# Patient Record
Sex: FEMALE | Race: WHITE | Employment: UNEMPLOYED | RURAL
[De-identification: names, ages, dates, MRNs, and addresses within clinical notes are randomized per-mention and may not be internally consistent; named-entity substitution may affect disease eponyms.]

---

## 2017-02-12 DIAGNOSIS — I10 ESSENTIAL HYPERTENSION: ICD-10-CM

## 2017-02-13 RX ORDER — ENALAPRIL MALEATE AND HYDROCHLOROTHIAZIDE 10; 25 MG/1; MG/1
TABLET ORAL
Qty: 30 TAB | Refills: 0 | Status: SHIPPED | OUTPATIENT
Start: 2017-02-13 | End: 2017-02-22 | Stop reason: SDUPTHER

## 2017-02-22 ENCOUNTER — OFFICE VISIT (OUTPATIENT)
Dept: INTERNAL MEDICINE CLINIC | Age: 62
End: 2017-02-22

## 2017-02-22 VITALS
WEIGHT: 143.6 LBS | SYSTOLIC BLOOD PRESSURE: 128 MMHG | BODY MASS INDEX: 23.93 KG/M2 | DIASTOLIC BLOOD PRESSURE: 78 MMHG | HEART RATE: 93 BPM | RESPIRATION RATE: 18 BRPM | OXYGEN SATURATION: 100 % | TEMPERATURE: 96.8 F | HEIGHT: 65 IN

## 2017-02-22 DIAGNOSIS — I10 ESSENTIAL HYPERTENSION: ICD-10-CM

## 2017-02-22 DIAGNOSIS — R80.9 PROTEINURIA: ICD-10-CM

## 2017-02-22 DIAGNOSIS — E78.00 HIGH CHOLESTEROL: ICD-10-CM

## 2017-02-22 DIAGNOSIS — R82.90 ABNORMAL URINE: ICD-10-CM

## 2017-02-22 DIAGNOSIS — R73.03 PREDIABETES: ICD-10-CM

## 2017-02-22 DIAGNOSIS — Z00.00 PHYSICAL EXAM: Primary | ICD-10-CM

## 2017-02-22 LAB
BILIRUB UR QL STRIP: NEGATIVE
GLUCOSE UR-MCNC: NEGATIVE MG/DL
KETONES P FAST UR STRIP-MCNC: NEGATIVE MG/DL
PH UR STRIP: 7 [PH] (ref 4.6–8)
PROT UR QL STRIP: NEGATIVE MG/DL
SP GR UR STRIP: 1.01 (ref 1–1.03)
UA UROBILINOGEN AMB POC: NORMAL (ref 0.2–1)
URINALYSIS CLARITY POC: CLEAR
URINALYSIS COLOR POC: YELLOW
URINE BLOOD POC: NEGATIVE
URINE LEUKOCYTES POC: NORMAL
URINE NITRITES POC: NEGATIVE

## 2017-02-22 RX ORDER — ENALAPRIL MALEATE AND HYDROCHLOROTHIAZIDE 10; 25 MG/1; MG/1
TABLET ORAL
Qty: 90 TAB | Refills: 1 | Status: SHIPPED | OUTPATIENT
Start: 2017-02-22 | End: 2017-08-23 | Stop reason: SDUPTHER

## 2017-02-22 NOTE — PROGRESS NOTES
Chief Complaint   Patient presents with    Hypertension     Medication Refill    Diabetes     1. Have you been to the ER, urgent care clinic since your last visit? Hospitalized since your last visit? No    2. Have you seen or consulted any other health care providers outside of the [address] [place] [address] since your last visit? Include any pap smears or colon screening.  No     Health Maintenance Due   Topic Date Due    Hepatitis C Screening  1955    PAP AKA CERVICAL CYTOLOGY  01/14/2017     Pap will be done in June 2017

## 2017-02-22 NOTE — MR AVS SNAPSHOT
Visit Information Date & Time Provider Department Dept. Phone Encounter #  
 2/22/2017  9:15 AM Errol Chiang NP [facility] [id number] Follow-up Instructions Return in about 6 months (around 8/22/2017). Upcoming Health Maintenance Date Due Hepatitis C Screening 1955 PAP AKA CERVICAL CYTOLOGY 1/14/2017 INFLUENZA AGE 9 TO ADULT 4/30/2017* DTaP/Tdap/Td series (1 - Tdap) 8/9/2017* BREAST CANCER SCRN MAMMOGRAM 1/7/2018* Bone Densitometry 2/17/2019 COLONOSCOPY 1/9/2020 *Topic was postponed. The date shown is not the original due date. Allergies as of 2/22/2017  Review Complete On: 2/22/2017 By: Errol Chiang NP No Known Allergies Current Immunizations  Reviewed on 8/9/2016 Name Date Influenza Vaccine 9/15/2014, 11/18/2013, 1/20/2013, 11/8/2011 Zoster Vaccine, Live 9/15/2014 Not reviewed this visit You Were Diagnosed With   
  
 Codes Comments Essential hypertension    -  Primary ICD-10-CM: I10 
ICD-9-CM: 401.9 High cholesterol     ICD-10-CM: E78.00 ICD-9-CM: 272.0 Prediabetes     ICD-10-CM: R73.03 
ICD-9-CM: 790.29 Proteinuria     ICD-10-CM: R80.9 ICD-9-CM: 791.0 Vitals BP  
  
  
  
  
  
 128/78 (BP 1 Location: Left arm, BP Patient Position: Sitting) Vitals History BMI and BSA Data Body Mass Index Body Surface Area  
 23.9 kg/m 2 1.73 m 2 Preferred Pharmacy Pharmacy Name Phone [place] [phone] [address] [phone] Your Updated Medication List  
  
   
This list is accurate as of: 2/22/17  9:57 AM.  Always use your most recent med list.  
  
  
  
  
 [employer]EL[employer] CH[employer]DRENS ASPIRIN 81 mg chewable tablet Generic drug:  aspirin Take 81 mg by mouth daily. calcium carb-vit d2-minerals Tab Commonly known as:  CALTRATE PLUS Take 1 Tab by mouth two (2) times a day.   
  
 CENTRUM PO  
 Take  by mouth.  
  
 cranberry 500 mg capsule Take 500 mg by mouth daily. enalapril-hydroCHLOROthiazide 10-25 mg tablet Commonly known as:  VASERETIC  
take 1 tablet by mouth once daily FISH OIL 1,000 mg Cap Generic drug:  omega-3 fatty acids-vitamin e Take 1 Cap by mouth.  
  
 melatonin 3 mg tablet Take  by mouth. Prescriptions Sent to Pharmacy Refills  
 enalapril-hydroCHLOROthiazide (VASERETIC) 10-25 mg tablet 1 Sig: take 1 tablet by mouth once daily Class: Normal  
 Pharmacy: [place] [id number] #: [phone] We Performed the Following AMB POC URINALYSIS DIP STICK MANUAL W/O MICRO [96428 CPT(R)] HEMOGLOBIN A1C WITH EAG [83739 CPT(R)] METABOLIC PANEL, COMPREHENSIVE [87607 CPT(R)] Follow-up Instructions Return in about 6 months (around 8/22/2017). Introducing [facility] & [facility]! Jenny Chavez introduces [employer] patient portal. Now you can access parts of your medical record, email your doctor's office, and request medication refills online. 1. In your internet browser, go to [email or web address] 2. Click on the First Time User? Click Here link in the Sign In box. You will see the New Member Sign Up page. 3. Enter your muz[employer] tv Access Code exactly as it appears below. You will not need to use this code after youve completed the sign-up process. If you do not sign up before the expiration date, you must request a new code. · [employer] tv Access Code: [email or web address] Expires: 5/23/2017  9:57 AM 
 
4. Enter the last four digits of your Social Security Number (xxxx) and Date of Birth (mm/dd/yyyy) as indicated and click Submit. You will be taken to the next sign-up page. 5. Create a muzu tv ID. This will be your muzu tv login ID and cannot be changed, so think of one that is secure and easy to remember. 6. Create a [employer] password. You can change your password at any time. 7. Enter your Password Reset Question and Answer. This can be used at a later time if you forget your password. 8. Enter your e-mail address. You will receive e-mail notification when new information is available in [address] 9. Click Sign Up. You can now view and download portions of your medical record. 10. Click the Download Summary menu link to download a portable copy of your medical information. If you have questions, please visit the Frequently Asked Questions section of the [employer] website. Remember, [employer] is NOT to be used for urgent needs. For medical emergencies, dial 911. Now available from your iPhone and Android! Please provide this summary of care documentation to your next provider. Your primary care clinician is listed as Alysha Fernández. If you have any questions after today's visit, please call [phone].

## 2017-02-24 LAB
ALBUMIN SERPL-MCNC: 4.1 G/DL (ref 3.6–4.8)
ALBUMIN/GLOB SERPL: 2 {RATIO} (ref 1.1–2.5)
ALP SERPL-CCNC: 70 IU/L (ref 39–117)
ALT SERPL-CCNC: 15 IU/L (ref 0–32)
AST SERPL-CCNC: 24 IU/L (ref 0–40)
BACTERIA UR CULT: NORMAL
BILIRUB SERPL-MCNC: 0.8 MG/DL (ref 0–1.2)
BUN SERPL-MCNC: 9 MG/DL (ref 8–27)
BUN/CREAT SERPL: 14 (ref 11–26)
CALCIUM SERPL-MCNC: 8.9 MG/DL (ref 8.7–10.3)
CHLORIDE SERPL-SCNC: 99 MMOL/L (ref 96–106)
CHOLEST SERPL-MCNC: 206 MG/DL (ref 100–199)
CO2 SERPL-SCNC: 23 MMOL/L (ref 18–29)
CREAT SERPL-MCNC: 0.65 MG/DL (ref 0.57–1)
EST. AVERAGE GLUCOSE BLD GHB EST-MCNC: 111 MG/DL
GLOBULIN SER CALC-MCNC: 2.1 G/DL (ref 1.5–4.5)
GLUCOSE SERPL-MCNC: 92 MG/DL (ref 65–99)
HBA1C MFR BLD: 5.5 % (ref 4.8–5.6)
HDLC SERPL-MCNC: 82 MG/DL
INTERPRETATION, 910389: NORMAL
LDLC SERPL CALC-MCNC: 110 MG/DL (ref 0–99)
POTASSIUM SERPL-SCNC: 4.2 MMOL/L (ref 3.5–5.2)
PROT SERPL-MCNC: 6.2 G/DL (ref 6–8.5)
SODIUM SERPL-SCNC: 137 MMOL/L (ref 134–144)
TRIGL SERPL-MCNC: 71 MG/DL (ref 0–149)
VLDLC SERPL CALC-MCNC: 14 MG/DL (ref 5–40)

## 2017-02-27 NOTE — PROGRESS NOTES
HISTORY OF PRESENT ILLNESS  Edda Lora is a 59 y.o. female. HPI  Chief Complaint   Patient presents with    Hypertension     Medication Refill    Blood sugar problem     Presented results from comprehensive procedures done with [facility]. Will check cmp, A1c, lipid panel  Today. Would like to have urine checked for proteinuria. Past Medical History:   Diagnosis Date    Arthropathy of cervical facet joint ([facility] [address] 9/2012    Cancer ([facility] [address] 2006    colon    Carotid atherosclerosis 9/2012    Hypertension 2007    Osteopenia 9/2012    Prediabetes      Social History     Social History    Marital status:      Spouse name: N/A    Number of children: 2    Years of education: N/A     Occupational History          Social History Main Topics    Smoking status: Never Smoker    Smokeless tobacco: Never Used    Alcohol use Yes      Comment: rarely    Drug use: No    Sexual activity: Not Currently     Other Topics Concern     Service No    Blood Transfusions No    Caffeine Concern No    Occupational Exposure No    Hobby Hazards No    Sleep Concern No    Stress Concern Yes     Patient is working on ways to self-calm    Weight Concern No    Special Diet No    Back Care No    Exercise Yes    Seat Belt Yes    Self-Exams Yes     Social History Narrative    , , 2 children       Review of Systems   Constitutional: Negative. Negative for chills, fever and malaise/fatigue. HENT: Negative. Eyes: Negative. Respiratory: Negative. Cardiovascular: Negative. Gastrointestinal: Negative. Genitourinary: Negative. Skin: Negative. Physical Exam   Constitutional: She is oriented to person, place, and time. She appears well-developed and well-nourished. HENT:   Head: Normocephalic and atraumatic. Eyes: Conjunctivae are normal. Right eye exhibits no discharge. Left eye exhibits no discharge.    Neck: Normal range of motion. Neck supple. Cardiovascular: Normal rate, regular rhythm, normal heart sounds and intact distal pulses. Exam reveals no gallop and no friction rub. No murmur heard. Pulmonary/Chest: Effort normal and breath sounds normal. No respiratory distress. She has no wheezes. She has no rales. Abdominal: Soft. Bowel sounds are normal. She exhibits no distension. There is no tenderness. Musculoskeletal: Normal range of motion. She exhibits no edema, tenderness or deformity. Neurological: She is alert and oriented to person, place, and time. She has normal reflexes. No cranial nerve deficit. She exhibits normal muscle tone. Coordination normal.   Skin: Skin is warm and dry. She is not diaphoretic. Nursing note and vitals reviewed. Plan of care and AVS reviewed with patient who verbalized understanding. ASSESSMENT and PLAN    ICD-10-CM ICD-9-CM    1. Physical exam Z00.00 V70.9    2. Essential hypertension F69 481.6 METABOLIC PANEL, COMPREHENSIVE      HEMOGLOBIN A1C WITH EAG      AMB POC URINALYSIS DIP STICK MANUAL W/O MICRO      enalapril-hydroCHLOROthiazide (VASERETIC) 10-25 mg tablet      CULTURE, URINE      DC HANDLG&/OR CONVEY OF SPEC FOR TR OFFICE TO LAB      DC COLLECTION VENOUS BLOOD,VENIPUNCTURE   3. High cholesterol M99.93 281.1 METABOLIC PANEL, COMPREHENSIVE      HEMOGLOBIN A1C WITH EAG      LIPID PANEL      AMB POC URINALYSIS DIP STICK MANUAL W/O MICRO      enalapril-hydroCHLOROthiazide (VASERETIC) 10-25 mg tablet      CULTURE, URINE      DC HANDLG&/OR CONVEY OF SPEC FOR TR OFFICE TO LAB      DC COLLECTION VENOUS BLOOD,VENIPUNCTURE   4. Prediabetes G54.33 835.06 METABOLIC PANEL, COMPREHENSIVE      HEMOGLOBIN A1C WITH EAG      AMB POC URINALYSIS DIP STICK MANUAL W/O MICRO      enalapril-hydroCHLOROthiazide (VASERETIC) 10-25 mg tablet      CULTURE, URINE      DC HANDLG&/OR CONVEY OF SPEC FOR TR OFFICE TO LAB      DC COLLECTION VENOUS BLOOD,VENIPUNCTURE   5.  Proteinuria R80.9 791.0 METABOLIC PANEL, COMPREHENSIVE      HEMOGLOBIN A1C WITH EAG      AMB POC URINALYSIS DIP STICK MANUAL W/O MICRO      enalapril-hydroCHLOROthiazide (VASERETIC) 10-25 mg tablet      CULTURE, URINE      ND HANDLG&/OR CONVEY OF SPEC FOR TR OFFICE TO LAB      ND COLLECTION VENOUS BLOOD,VENIPUNCTURE   6. Abnormal urine N79.25 766.1 METABOLIC PANEL, COMPREHENSIVE      HEMOGLOBIN A1C WITH EAG      AMB POC URINALYSIS DIP STICK MANUAL W/O MICRO      enalapril-hydroCHLOROthiazide (VASERETIC) 10-25 mg tablet      CULTURE, URINE      ND HANDLG&/OR CONVEY OF SPEC FOR TR OFFICE TO LAB      ND COLLECTION VENOUS BLOOD,VENIPUNCTURE   7. Normal body mass index (BMI) Z78.9 V49.89    Will notify of lab results in letter.   F/u 6 months HTN

## 2017-08-08 ENCOUNTER — OFFICE VISIT (OUTPATIENT)
Dept: INTERNAL MEDICINE CLINIC | Age: 62
End: 2017-08-08

## 2017-08-08 VITALS
WEIGHT: 142.2 LBS | HEART RATE: 87 BPM | DIASTOLIC BLOOD PRESSURE: 78 MMHG | TEMPERATURE: 96.6 F | OXYGEN SATURATION: 100 % | BODY MASS INDEX: 23.69 KG/M2 | HEIGHT: 65 IN | RESPIRATION RATE: 16 BRPM | SYSTOLIC BLOOD PRESSURE: 146 MMHG

## 2017-08-08 DIAGNOSIS — I10 ESSENTIAL HYPERTENSION: Primary | ICD-10-CM

## 2017-08-08 DIAGNOSIS — R73.03 PREDIABETES: ICD-10-CM

## 2017-08-08 DIAGNOSIS — E78.00 HIGH CHOLESTEROL: ICD-10-CM

## 2017-08-08 DIAGNOSIS — Z87.448 HX OF HEMATURIA: ICD-10-CM

## 2017-08-08 LAB
BILIRUB UR QL STRIP: NEGATIVE
GLUCOSE UR-MCNC: NEGATIVE MG/DL
KETONES P FAST UR STRIP-MCNC: NEGATIVE MG/DL
PH UR STRIP: 7 [PH] (ref 4.6–8)
PROT UR QL STRIP: NEGATIVE MG/DL
SP GR UR STRIP: 1.02 (ref 1–1.03)
UA UROBILINOGEN AMB POC: NORMAL (ref 0.2–1)
URINALYSIS CLARITY POC: CLEAR
URINALYSIS COLOR POC: YELLOW
URINE BLOOD POC: NEGATIVE
URINE LEUKOCYTES POC: NORMAL
URINE NITRITES POC: NEGATIVE

## 2017-08-08 RX ORDER — TETANUS TOXOID, REDUCED DIPHTHERIA TOXOID AND ACELLULAR PERTUSSIS VACCINE, ADSORBED 5; 2.5; 8; 8; 2.5 [IU]/.5ML; [IU]/.5ML; UG/.5ML; UG/.5ML; UG/.5ML
SUSPENSION INTRAMUSCULAR
Refills: 0 | COMMUNITY
Start: 2017-06-07

## 2017-08-08 RX ORDER — GUAIFENESIN 100 MG/5ML
81 LIQUID (ML) ORAL DAILY
COMMUNITY
Start: 2017-08-08

## 2017-08-08 RX ORDER — EFINACONAZOLE 100 MG/ML
SOLUTION TOPICAL
COMMUNITY
Start: 2017-06-09

## 2017-08-08 NOTE — PROGRESS NOTES
Chief Complaint   Patient presents with    Hypertension     FOLLOW UP    Ear Fullness     RIGHT EAR;     1. Have you been to the ER, urgent care clinic since your last visit? Hospitalized since your last visit? No    2. Have you seen or consulted any other health care providers outside of the [address] [place] [address] since your last visit? Include any pap smears or colon screening.  No     Health Maintenance Due   Topic Date Due    PAP AKA CERVICAL CYTOLOGY  01/14/2017    INFLUENZA AGE 9 TO ADULT  08/01/2017     Pap Smear (Next Week) ---[facility])

## 2017-08-08 NOTE — PROGRESS NOTES
HISTORY OF PRESENT ILLNESS  Annika Green is a 57 y.o. female. HPI  Chief Complaint   Patient presents with    Hypertension     FOLLOW UP    Ear Fullness     RIGHT EAR; Will see GYN soon for Pap  Has results for [facility]; Will get copies    BP elevated to day  Admits to taking BP medication as prescribed. Denies chest pain or SOB, or swelling of extremities. Admits to following diet to lower BP. C/O ear fullness x 1 month  States discomfort has resolved. No treatment  Review of Systems   Constitutional: Negative. Negative for chills and fever. HENT: Negative. Negative for congestion, ear pain and sore throat. Eyes: Negative. Negative for blurred vision, pain and discharge. Respiratory: Negative for cough, shortness of breath and wheezing. Cardiovascular: Negative. Negative for chest pain, palpitations and leg swelling. Gastrointestinal: Negative. Negative for abdominal pain, blood in stool, constipation, diarrhea, nausea and vomiting. Genitourinary: Negative. Musculoskeletal: Negative. Negative for back pain and myalgias. Skin: Negative. Neurological: Negative for dizziness and headaches. Physical Exam   Constitutional: She is oriented to person, place, and time. She appears well-developed and well-nourished. No distress. HENT:   Head: Normocephalic and atraumatic. Eyes: Conjunctivae are normal.   Neck: Normal range of motion. Cardiovascular: Normal rate, regular rhythm, normal heart sounds and intact distal pulses. Exam reveals no gallop and no friction rub. No murmur heard. Pulmonary/Chest: Effort normal and breath sounds normal. No respiratory distress. She has no wheezes. She has no rales. Abdominal: Soft. Musculoskeletal: Normal range of motion. Neurological: She is alert and oriented to person, place, and time. Skin: Skin is warm and dry. She is not diaphoretic. Psychiatric: She has a normal mood and affect.  Her behavior is normal. Thought content normal.   Nursing note and vitals reviewed. Plan of care and AVS reviewed with patient who verbalized understanding. ASSESSMENT and PLAN    ICD-10-CM ICD-9-CM    1. Essential hypertension I10 401.9 BOOSTRIX TDAP 2.5-8-5 Lf-mcg-Lf/0.5mL syrg      JUBLIA pamela topical solution      aspirin (EVELINE CHILDRENS ASPIRIN) 81 mg chewable tablet      METABOLIC PANEL, COMPREHENSIVE      HEMOGLOBIN A1C WITH EAG      AMB POC URINALYSIS DIP STICK MANUAL W/O MICRO      LIPID PANEL      WI HANDLG&/OR CONVEY OF SPEC FOR TR OFFICE TO LAB      WI COLLECTION VENOUS BLOOD,VENIPUNCTURE   2. Prediabetes R73.03 790.29 BOOSTRIX TDAP 2.5-8-5 Lf-mcg-Lf/0.5mL syrg      JUBLIA pamela topical solution      aspirin (EVELINE CHILDRENS ASPIRIN) 81 mg chewable tablet      METABOLIC PANEL, COMPREHENSIVE      HEMOGLOBIN A1C WITH EAG      AMB POC URINALYSIS DIP STICK MANUAL W/O MICRO      LIPID PANEL      WI HANDLG&/OR CONVEY OF SPEC FOR TR OFFICE TO LAB      WI COLLECTION VENOUS BLOOD,VENIPUNCTURE   3. Hx of hematuria Z87.448 V13.09 BOOSTRIX TDAP 2.5-8-5 Lf-mcg-Lf/0.5mL syrg      JUBLIA pamela topical solution      aspirin (EVELINE CHILDRENS ASPIRIN) 81 mg chewable tablet      METABOLIC PANEL, COMPREHENSIVE      HEMOGLOBIN A1C WITH EAG      AMB POC URINALYSIS DIP STICK MANUAL W/O MICRO      LIPID PANEL   4. High cholesterol E78.00 272.0 BOOSTRIX TDAP 2.5-8-5 Lf-mcg-Lf/0.5mL syrg      JUBLIA pamela topical solution      aspirin (EVELINE CHILDRENS ASPIRIN) 81 mg chewable tablet      METABOLIC PANEL, COMPREHENSIVE      HEMOGLOBIN A1C WITH EAG      AMB POC URINALYSIS DIP STICK MANUAL W/O MICRO      LIPID PANEL      WI HANDLG&/OR CONVEY OF SPEC FOR TR OFFICE TO LAB      WI COLLECTION VENOUS BLOOD,VENIPUNCTURE   Will notify patient of labs  F/U 6 month HTN.

## 2017-08-08 NOTE — MR AVS SNAPSHOT
Visit Information Date & Time Provider Department Dept. Phone Encounter #  
 8/8/2017  8:45 AM Arpit Zuluaga NP [facility] [phone] [id number] Follow-up Instructions Return in about 6 months (around 2/8/2018) for BP, prediabetes. Upcoming Health Maintenance Date Due  
 PAP AKA CERVICAL CYTOLOGY 1/14/2017 INFLUENZA AGE 9 TO ADULT 8/1/2017 DTaP/Tdap/Td series (1 - Tdap) 8/9/2017* BREAST CANCER SCRN MAMMOGRAM 1/7/2018* Hepatitis C Screening 2/26/2018* Bone Densitometry 2/17/2019 COLONOSCOPY 1/9/2020 *Topic was postponed. The date shown is not the original due date. Allergies as of 8/8/2017  Review Complete On: 8/8/2017 By: Arpit Zuluaga NP No Known Allergies Current Immunizations  Reviewed on 8/9/2016 Name Date Influenza Vaccine 9/15/2014, 11/18/2013, 1/20/2013, 11/8/2011 Zoster Vaccine, Live 9/15/2014 Not reviewed this visit You Were Diagnosed With   
  
 Codes Comments Essential hypertension    -  Primary ICD-10-CM: I10 
ICD-9-CM: 401.9 Prediabetes     ICD-10-CM: R73.03 
ICD-9-CM: 790.29 Hx of hematuria     ICD-10-CM: Z87.448 ICD-9-CM: V13.09 Vitals BP Pulse Temp Resp Height(growth percentile) Weight(growth percentile) 173/74 (BP 1 Location: Left arm, BP Patient Position: Sitting) 91 96.6 °F (35.9 °C) (Oral) 16 5' 5\" (1.651 m) 142 lb 3.2 oz (64.5 kg) LMP SpO2 BMI OB Status Smoking Status 06/20/2005 100% 23.66 kg/m2 Postmenopausal Never Smoker Vitals History BMI and BSA Data Body Mass Index Body Surface Area  
 23.66 kg/m 2 1.72 m 2 Preferred Pharmacy Pharmacy Name Phone [facility] [phone] [facility] [address] [phone] Your Updated Medication List  
  
   
This list is accurate as of: 8/8/17  9:29 AM.  Always use your most recent med list.  
  
  
  
  
 [employer] CHILDRENS ASPIRIN 81 mg chewable tablet Generic drug:  aspirin Take 1 Tab by mouth daily. BOOSTRIX TDAP 2.5-8-5 Lf-mcg-Lf/0.5mL Syrg Generic drug:  Diphth, Pertus(Acell), Tetanus  
inject 0.5 milliliter intramuscularly  
  
 calcium carb-vit d2-minerals Tab Commonly known as:  CALTRATE PLUS Take 1 Tab by mouth two (2) times a day. CENTRUM PO Take  by mouth.  
  
 cranberry 500 mg capsule Take 500 mg by mouth daily. enalapril-hydroCHLOROthiazide 10-25 mg tablet Commonly known as:  VASERETIC  
take 1 tablet by mouth once daily FISH OIL 1,000 mg Cap Generic drug:  omega-3 fatty acids-vitamin e Take 1 Cap by mouth. JUBLIA Jeanine topical solution Generic drug:  efinaconazole  
  
 melatonin 3 mg tablet Take  by mouth. We Performed the Following AMB POC URINALYSIS DIP STICK MANUAL W/O MICRO [78883 CPT(R)] HEMOGLOBIN A1C WITH EAG [97395 CPT(R)] METABOLIC PANEL, COMPREHENSIVE [80203 CPT(R)] Follow-up Instructions Return in about 6 months (around 2/8/2018) for BP, prediabetes. Introducing hospitals [facility]! Mathew Gillis introduces [employer] patient portal. Now you can access parts of your medical record, email your doctor's office, and request medication refills online. 1. In your internet browser, go to [email or web address] 2. Click on the First Time User? Click Here link in the Sign In box. You will see the New Member Sign Up page. 3. Enter your [employer] Access Code exactly as it appears below. You will not need to use this code after youve completed the sign-up process. If you do not sign up before the expiration date, you must request a new code. · [employer] Access Code: [email or web address] Expires: 11/6/2017  8:42 AM 
 
4. Enter the last four digits of your Social Security Number (xxxx) and Date of Birth (mm/dd/yyyy) as indicated and click Submit. You will be taken to the next sign-up page. 5. Create a [employer] ID. This will be your [employer] login ID and cannot be changed, so think of one that is secure and easy to remember. 6. Create a [employer] password. You can change your password at any time. 7. Enter your Password Reset Question and Answer. This can be used at a later time if you forget your password. 8. Enter your e-mail address. You will receive e-mail notification when new information is available in [address] 9. Click Sign Up. You can now view and download portions of your medical record. 10. Click the Download Summary menu link to download a portable copy of your medical information. If you have questions, please visit the Frequently Asked Questions section of the [employer] website. Remember, [employer] is NOT to be used for urgent needs. For medical emergencies, dial 911. Now available from your iPhone and Android! Please provide this summary of care documentation to your next provider. Your primary care clinician is listed as Jenni Tian. If you have any questions after today's visit, please call [phone].

## 2017-08-09 LAB
ALBUMIN SERPL-MCNC: 4.4 G/DL (ref 3.6–4.8)
ALBUMIN/GLOB SERPL: 2.1 {RATIO} (ref 1.2–2.2)
ALP SERPL-CCNC: 80 IU/L (ref 39–117)
ALT SERPL-CCNC: 17 IU/L (ref 0–32)
AST SERPL-CCNC: 25 IU/L (ref 0–40)
BILIRUB SERPL-MCNC: 0.6 MG/DL (ref 0–1.2)
BUN SERPL-MCNC: 13 MG/DL (ref 8–27)
BUN/CREAT SERPL: 22 (ref 12–28)
CALCIUM SERPL-MCNC: 9.3 MG/DL (ref 8.7–10.3)
CHLORIDE SERPL-SCNC: 99 MMOL/L (ref 96–106)
CHOLEST SERPL-MCNC: 214 MG/DL (ref 100–199)
CO2 SERPL-SCNC: 27 MMOL/L (ref 18–29)
CREAT SERPL-MCNC: 0.59 MG/DL (ref 0.57–1)
EST. AVERAGE GLUCOSE BLD GHB EST-MCNC: 114 MG/DL
GLOBULIN SER CALC-MCNC: 2.1 G/DL (ref 1.5–4.5)
GLUCOSE SERPL-MCNC: 90 MG/DL (ref 65–99)
HBA1C MFR BLD: 5.6 % (ref 4.8–5.6)
HDLC SERPL-MCNC: 82 MG/DL
INTERPRETATION, 910389: NORMAL
LDLC SERPL CALC-MCNC: 121 MG/DL (ref 0–99)
POTASSIUM SERPL-SCNC: 4.3 MMOL/L (ref 3.5–5.2)
PROT SERPL-MCNC: 6.5 G/DL (ref 6–8.5)
SODIUM SERPL-SCNC: 139 MMOL/L (ref 134–144)
TRIGL SERPL-MCNC: 57 MG/DL (ref 0–149)
VLDLC SERPL CALC-MCNC: 11 MG/DL (ref 5–40)

## 2017-08-16 ENCOUNTER — TELEPHONE (OUTPATIENT)
Dept: INTERNAL MEDICINE CLINIC | Age: 62
End: 2017-08-16

## 2017-08-16 NOTE — TELEPHONE ENCOUNTER
Chief Complaint   Patient presents with    Returning Call    Results     LABS     Patient has been informed of her labs and that it will be mailed to her home address. She has questions about the labs that will check her kidneys.

## 2017-08-23 DIAGNOSIS — E78.00 HIGH CHOLESTEROL: ICD-10-CM

## 2017-08-23 DIAGNOSIS — R82.90 ABNORMAL URINE: ICD-10-CM

## 2017-08-23 DIAGNOSIS — R80.9 PROTEINURIA: ICD-10-CM

## 2017-08-23 DIAGNOSIS — R73.03 PREDIABETES: ICD-10-CM

## 2017-08-23 DIAGNOSIS — I10 ESSENTIAL HYPERTENSION: ICD-10-CM

## 2017-08-26 RX ORDER — ENALAPRIL MALEATE AND HYDROCHLOROTHIAZIDE 10; 25 MG/1; MG/1
TABLET ORAL
Qty: 90 TAB | Refills: 1 | Status: SHIPPED | OUTPATIENT
Start: 2017-08-26 | End: 2017-08-29 | Stop reason: SDUPTHER

## 2017-08-29 ENCOUNTER — CLINICAL SUPPORT (OUTPATIENT)
Dept: INTERNAL MEDICINE CLINIC | Age: 62
End: 2017-08-29

## 2017-08-29 VITALS
HEIGHT: 65 IN | SYSTOLIC BLOOD PRESSURE: 136 MMHG | TEMPERATURE: 97.5 F | BODY MASS INDEX: 23.63 KG/M2 | OXYGEN SATURATION: 100 % | RESPIRATION RATE: 16 BRPM | WEIGHT: 141.8 LBS | DIASTOLIC BLOOD PRESSURE: 72 MMHG | HEART RATE: 86 BPM

## 2017-08-29 DIAGNOSIS — R73.03 PREDIABETES: ICD-10-CM

## 2017-08-29 DIAGNOSIS — E78.00 HIGH CHOLESTEROL: ICD-10-CM

## 2017-08-29 DIAGNOSIS — R82.90 ABNORMAL URINE: ICD-10-CM

## 2017-08-29 DIAGNOSIS — Z01.30 BP CHECK: Primary | ICD-10-CM

## 2017-08-29 DIAGNOSIS — I10 ESSENTIAL HYPERTENSION: ICD-10-CM

## 2017-08-29 NOTE — TELEPHONE ENCOUNTER
Requested Prescriptions     Pending Prescriptions Disp Refills    enalapril-hydroCHLOROthiazide (VASERETIC) 10-25 mg tablet 90 Tab 1     Last Office Visit:  08/29/2017  Last Filled:  08/26/2017 + 1 refill  Changes Made to Medication on Last Visit:  None

## 2017-08-30 RX ORDER — ENALAPRIL MALEATE AND HYDROCHLOROTHIAZIDE 10; 25 MG/1; MG/1
1 TABLET ORAL DAILY
Qty: 90 TAB | Refills: 1 | Status: SHIPPED | OUTPATIENT
Start: 2017-08-30 | End: 2018-02-06 | Stop reason: SDUPTHER

## 2018-02-06 ENCOUNTER — OFFICE VISIT (OUTPATIENT)
Dept: INTERNAL MEDICINE CLINIC | Age: 63
End: 2018-02-06

## 2018-02-06 VITALS
WEIGHT: 149.4 LBS | SYSTOLIC BLOOD PRESSURE: 138 MMHG | RESPIRATION RATE: 18 BRPM | OXYGEN SATURATION: 98 % | TEMPERATURE: 98.2 F | BODY MASS INDEX: 24.89 KG/M2 | HEIGHT: 65 IN | HEART RATE: 90 BPM | DIASTOLIC BLOOD PRESSURE: 70 MMHG

## 2018-02-06 DIAGNOSIS — Z00.00 PHYSICAL EXAM, ANNUAL: Primary | ICD-10-CM

## 2018-02-06 DIAGNOSIS — R82.90 ABNORMAL URINE: ICD-10-CM

## 2018-02-06 DIAGNOSIS — I10 ESSENTIAL HYPERTENSION: ICD-10-CM

## 2018-02-06 DIAGNOSIS — R73.03 PREDIABETES: ICD-10-CM

## 2018-02-06 LAB
BILIRUB UR QL STRIP: NEGATIVE
GLUCOSE UR-MCNC: NEGATIVE MG/DL
KETONES P FAST UR STRIP-MCNC: NEGATIVE MG/DL
PH UR STRIP: 7.5 [PH] (ref 4.6–8)
PROT UR QL STRIP: NEGATIVE
SP GR UR STRIP: 1.01 (ref 1–1.03)
UA UROBILINOGEN AMB POC: NORMAL (ref 0.2–1)
URINALYSIS CLARITY POC: CLEAR
URINALYSIS COLOR POC: YELLOW
URINE BLOOD POC: NEGATIVE
URINE LEUKOCYTES POC: NORMAL
URINE NITRITES POC: NEGATIVE

## 2018-02-06 RX ORDER — CLOBETASOL PROPIONATE 0.5 MG/G
CREAM TOPICAL
Refills: 0 | COMMUNITY
Start: 2017-12-07

## 2018-02-06 RX ORDER — ENALAPRIL MALEATE AND HYDROCHLOROTHIAZIDE 10; 25 MG/1; MG/1
1 TABLET ORAL DAILY
Qty: 90 TAB | Refills: 1 | Status: SHIPPED | OUTPATIENT
Start: 2018-02-06 | End: 2018-08-30 | Stop reason: SDUPTHER

## 2018-02-06 NOTE — MR AVS SNAPSHOT
[address] [address] [facility] 
[phone] Patient: Radha Coreas MRN:  [id number] Visit Information Date & Time Provider Department Dept. Phone Encounter #  
 2/6/2018  8:45 AM [facility] [id number] [phone] Follow-up Instructions Return in about 6 months (around 8/6/2018) for HTN. Upcoming Health Maintenance Date Due  
 BREAST CANCER SCRN MAMMOGRAM 1/8/2016 PAP AKA CERVICAL CYTOLOGY 1/14/2017 Hepatitis C Screening 2/26/2018* Bone Densitometry 2/17/2019 COLONOSCOPY 1/9/2020 DTaP/Tdap/Td series (2 - Td) 8/8/2027 *Topic was postponed. The date shown is not the original due date. Allergies as of 2/6/2018  Review Complete On: 2/6/2018 By: Marga Yap NP Severity Noted Reaction Type Reactions Cephalexin Low 09/16/2017    Rash Current Immunizations  Reviewed on 8/9/2016 Name Date Influenza Vaccine 9/15/2014, 11/18/2013, 1/20/2013, 11/8/2011 Zoster Vaccine, Live 9/15/2014 Not reviewed this visit You Were Diagnosed With   
  
 Codes Comments Physical exam, annual    -  Primary ICD-10-CM: Z00.00 ICD-9-CM: V70.0 Essential hypertension     ICD-10-CM: I10 
ICD-9-CM: 401.9 High cholesterol     ICD-10-CM: E78.00 ICD-9-CM: 272.0 Prediabetes     ICD-10-CM: R73.03 
ICD-9-CM: 790.29 Abnormal urine     ICD-10-CM: R82.90 ICD-9-CM: 791.9 Vitals BP Pulse Temp Resp Height(growth percentile) Weight(growth percentile) 138/70 (BP 1 Location: Left arm, BP Patient Position: Sitting) 90 98.2 °F (36.8 °C) (Temporal) 18 5' 5\" (1.651 m) 149 lb 6.4 oz (67.8 kg) LMP SpO2 BMI OB Status Smoking Status 06/20/2005 98% 24.86 kg/m2 Postmenopausal Never Smoker Vitals History BMI and BSA Data Body Mass Index Body Surface Area  
 24.86 kg/m 2 1.76 m 2 Preferred Pharmacy Pharmacy Name Phone [facility] [phone] [address] [phone] Your Updated Medication List  
  
   
This list is accurate as of: 2/6/18  9:24 AM.  Always use your most recent med list.  
  
  
  
  
 EVELINE CHILDRENS ASPIRIN 81 mg chewable tablet Generic drug:  aspirin Take 1 Tab by mouth daily. BOOSTRIX TDAP 2.5-8-5 Lf-mcg-Lf/0.5mL Syrg Generic drug:  Diphth, Pertus(Acell), Tetanus  
inject 0.5 milliliter intramuscularly  
  
 calcium carb-vit d2-minerals Tab Commonly known as:  CALTRATE PLUS Take 1 Tab by mouth two (2) times a day. CENTRUM PO Take  by mouth.  
  
 clobetasol 0.05 % topical cream  
Commonly known as:  TEMOVATE  
  
 cranberry 500 mg capsule Take 500 mg by mouth daily. enalapril-hydroCHLOROthiazide 10-25 mg tablet Commonly known as:  Lapine Raphael Take 1 Tab by mouth daily. FISH OIL 1,000 mg Cap Generic drug:  omega-3 fatty acids-vitamin e Take 1 Cap by mouth. JUBLIA Jeanine topical solution Generic drug:  efinaconazole  
  
 melatonin 3 mg tablet Take  by mouth. Prescriptions Sent to Pharmacy Refills  
 enalapril-hydroCHLOROthiazide (VASERETIC) 10-25 mg tablet 1 Sig: Take 1 Tab by mouth daily. Class: Normal  
 Pharmacy: [facility] [phone] [address] Ph #: [phone] Route: Oral  
  
We Performed the Following AMB POC URINALYSIS DIP STICK MANUAL W/O MICRO [03830 CPT(R)] METABOLIC PANEL, COMPREHENSIVE [94793 CPT(R)] Follow-up Instructions Return in about 6 months (around 8/6/2018) for HTN. Introducing [facility]! [facility] introduces [employer] patient portal. Now you can access parts of your medical record, email your doctor's office, and request medication refills online. 1. In your internet browser, go to [email or web address] 2. Click on the First Time User? Click Here link in the Sign In box. You will see the New Member Sign Up page. 3. Enter your [employer] Access Code exactly as it appears below. You will not need to use this code after youve completed the sign-up process. If you do not sign up before the expiration date, you must request a new code. · [employer] Access Code: [email or web address] Expires: 5/7/2018  8:47 AM 
 
4. Enter the last four digits of your Social Security Number (xxxx) and Date of Birth (mm/dd/yyyy) as indicated and click Submit. You will be taken to the next sign-up page. 5. Create a [employer] ID. This will be your [employer] login ID and cannot be changed, so think of one that is secure and easy to remember. 6. Create a [employer] password. You can change your password at any time. 7. Enter your Password Reset Question and Answer. This can be used at a later time if you forget your password. 8. Enter your e-mail address. You will receive e-mail notification when new information is available in [address] 9. Click Sign Up. You can now view and download portions of your medical record. 10. Click the Download Summary menu link to download a portable copy of your medical information. If you have questions, please visit the Frequently Asked Questions section of the [employer] website. Remember, [employer] is NOT to be used for urgent needs. For medical emergencies, dial 911. Now available from your iPhone and Android! Please provide this summary of care documentation to your next provider. Your primary care clinician is listed as Eula Maloney. If you have any questions after today's visit, please call [phone].

## 2018-02-06 NOTE — PROGRESS NOTES
HISTORY OF PRESENT ILLNESS  Heladio Beltran is a 57 y.o. female. HPI  Chief Complaint   Patient presents with    Hypertension     FOLLOW UP    Physical     Denies any acute problems. Past Medical History:   Diagnosis Date    Arthropathy of cervical facet joint     Cancer ([address] 2006    colon    Carotid atherosclerosis 9/2012    High cholesterol     Hypertension 2007    Osteopenia 9/2012    Prediabetes      Social History     Social History    Marital status:      Spouse name: N/A    Number of children: 2    Years of education: N/A     Occupational History          Social History Main Topics    Smoking status: Never Smoker    Smokeless tobacco: Never Used    Alcohol use Yes      Comment: rarely    Drug use: No    Sexual activity: Not Currently     Other Topics Concern     Service No    Blood Transfusions No    Caffeine Concern No    Occupational Exposure No    Hobby Hazards No    Sleep Concern No    Stress Concern Yes     Patient is working on ways to self-calm    Weight Concern No    Special Diet No    Back Care No    Exercise Yes    Seat Belt Yes    Self-Exams Yes     Social History Narrative    , , 2 children       Review of Systems   Constitutional: Negative for chills, fever and malaise/fatigue. Eyes: Negative. Respiratory: Negative. Negative for cough, shortness of breath and wheezing. Cardiovascular: Negative. Negative for chest pain and leg swelling. Gastrointestinal: Negative for abdominal pain, constipation, diarrhea, nausea and vomiting. Genitourinary: Negative. Negative for dysuria and hematuria. Musculoskeletal: Negative. Skin: Negative. Neurological: Negative. Physical Exam   Constitutional: She is oriented to person, place, and time. She appears well-developed and well-nourished. No distress. HENT:   Head: Normocephalic and atraumatic.    Eyes: Conjunctivae are normal.   Neck: Normal range of motion. Neck supple. Cardiovascular: Normal rate, regular rhythm, normal heart sounds and intact distal pulses. Exam reveals no gallop and no friction rub. No murmur heard. Pulmonary/Chest: Effort normal and breath sounds normal. No respiratory distress. She has no wheezes. She has no rales. Musculoskeletal: Normal range of motion. Neurological: She is alert and oriented to person, place, and time. Skin: Skin is warm and dry. She is not diaphoretic. Nursing note and vitals reviewed. Plan of care and AVS reviewed with patient who verbalized understanding. ASSESSMENT and PLAN    ICD-10-CM ICD-9-CM    1. Physical exam, annual Z00.00 V70.0 AMB POC URINALYSIS DIP STICK MANUAL W/O MICRO   2. Essential hypertension J13 948.8 METABOLIC PANEL, COMPREHENSIVE      enalapril-hydroCHLOROthiazide (VASERETIC) 10-25 mg tablet      DC HANDLG&/OR CONVEY OF SPEC FOR TR OFFICE TO LAB      COLLECTION VENOUS BLOOD,VENIPUNCTURE   3. High cholesterol E78.00 272.0 enalapril-hydroCHLOROthiazide (VASERETIC) 10-25 mg tablet      DC HANDLG&/OR CONVEY OF SPEC FOR TR OFFICE TO LAB      COLLECTION VENOUS BLOOD,VENIPUNCTURE   4. Prediabetes R73.03 790.29 enalapril-hydroCHLOROthiazide (VASERETIC) 10-25 mg tablet      DC HANDLG&/OR CONVEY OF SPEC FOR TR OFFICE TO LAB      COLLECTION VENOUS BLOOD,VENIPUNCTURE   5. Abnormal urine R82.90 791.9 enalapril-hydroCHLOROthiazide (VASERETIC) 10-25 mg tablet   Urine with small amount of WBC's patient to monitor  Will notify of lab results. F/U 6 months HTN.

## 2018-02-07 LAB
ALBUMIN SERPL-MCNC: 4.1 G/DL (ref 3.6–4.8)
ALBUMIN/GLOB SERPL: 2 {RATIO} (ref 1.2–2.2)
ALP SERPL-CCNC: 71 IU/L (ref 39–117)
ALT SERPL-CCNC: 22 IU/L (ref 0–32)
AST SERPL-CCNC: 25 IU/L (ref 0–40)
BILIRUB SERPL-MCNC: 0.7 MG/DL (ref 0–1.2)
BUN SERPL-MCNC: 12 MG/DL (ref 8–27)
BUN/CREAT SERPL: 20 (ref 12–28)
CALCIUM SERPL-MCNC: 8.9 MG/DL (ref 8.7–10.3)
CHLORIDE SERPL-SCNC: 99 MMOL/L (ref 96–106)
CO2 SERPL-SCNC: 26 MMOL/L (ref 18–29)
CREAT SERPL-MCNC: 0.6 MG/DL (ref 0.57–1)
GFR SERPLBLD CREATININE-BSD FMLA CKD-EPI: 111 ML/MIN/1.73
GFR SERPLBLD CREATININE-BSD FMLA CKD-EPI: 97 ML/MIN/1.73
GLOBULIN SER CALC-MCNC: 2.1 G/DL (ref 1.5–4.5)
GLUCOSE SERPL-MCNC: 90 MG/DL (ref 65–99)
POTASSIUM SERPL-SCNC: 4.4 MMOL/L (ref 3.5–5.2)
PROT SERPL-MCNC: 6.2 G/DL (ref 6–8.5)
SODIUM SERPL-SCNC: 138 MMOL/L (ref 134–144)

## 2018-08-30 DIAGNOSIS — R73.03 PREDIABETES: ICD-10-CM

## 2018-08-30 DIAGNOSIS — I10 ESSENTIAL HYPERTENSION: ICD-10-CM

## 2018-08-30 DIAGNOSIS — R82.90 ABNORMAL URINE: ICD-10-CM

## 2018-08-31 NOTE — TELEPHONE ENCOUNTER
Requested Prescriptions     Pending Prescriptions Disp Refills    enalapril-hydroCHLOROthiazide (VASERETIC) 10-25 mg tablet [Pharmacy Med Name: ENALAPRIL-HCTZ 10-25 MG TABLET] 90 Tab 1     Sig: take 1 tablet by mouth once daily     Future Appointments:  No future appointments. Last Appointment With Me:  02.06.2018  Last Filled:  02.06.2018 # 90 + 1 refill  Changes Made to Medication on Last Visit:  None. LM to return call to discuss refill.

## 2018-09-01 RX ORDER — ENALAPRIL MALEATE AND HYDROCHLOROTHIAZIDE 10; 25 MG/1; MG/1
TABLET ORAL
Qty: 90 TAB | Refills: 0 | Status: SHIPPED | OUTPATIENT
Start: 2018-09-01

## 2020-06-25 ENCOUNTER — HOSPITAL ENCOUNTER (OUTPATIENT)
Age: 65
Setting detail: OUTPATIENT SURGERY
Discharge: HOME | End: 2020-06-25
Attending: INTERNAL MEDICINE | Admitting: INTERNAL MEDICINE
Payer: MEDICARE

## 2020-06-25 ENCOUNTER — ANESTHESIA (OUTPATIENT)
Dept: ENDOSCOPY | Age: 65
End: 2020-06-25
Payer: MEDICARE

## 2020-06-25 ENCOUNTER — ANESTHESIA EVENT (OUTPATIENT)
Dept: ENDOSCOPY | Age: 65
End: 2020-06-25
Payer: MEDICARE

## 2020-06-25 VITALS
DIASTOLIC BLOOD PRESSURE: 65 MMHG | HEART RATE: 67 BPM | WEIGHT: 155.8 LBS | HEIGHT: 65 IN | RESPIRATION RATE: 15 BRPM | BODY MASS INDEX: 25.96 KG/M2 | OXYGEN SATURATION: 98 % | TEMPERATURE: 97.8 F | SYSTOLIC BLOOD PRESSURE: 142 MMHG

## 2020-06-25 PROCEDURE — 77030018712 HC DEV BLLN INFL BSC -B: Performed by: INTERNAL MEDICINE

## 2020-06-25 PROCEDURE — 77030019988 HC FCPS ENDOSC DISP BSC -B: Performed by: INTERNAL MEDICINE

## 2020-06-25 PROCEDURE — 74011250636 HC RX REV CODE- 250/636: Performed by: INTERNAL MEDICINE

## 2020-06-25 PROCEDURE — 76040000007: Performed by: INTERNAL MEDICINE

## 2020-06-25 PROCEDURE — 74011000250 HC RX REV CODE- 250: Performed by: NURSE ANESTHETIST, CERTIFIED REGISTERED

## 2020-06-25 PROCEDURE — 88305 TISSUE EXAM BY PATHOLOGIST: CPT

## 2020-06-25 PROCEDURE — 76060000032 HC ANESTHESIA 0.5 TO 1 HR: Performed by: INTERNAL MEDICINE

## 2020-06-25 PROCEDURE — 74011250636 HC RX REV CODE- 250/636: Performed by: NURSE ANESTHETIST, CERTIFIED REGISTERED

## 2020-06-25 RX ORDER — LIDOCAINE HYDROCHLORIDE 20 MG/ML
INJECTION, SOLUTION EPIDURAL; INFILTRATION; INTRACAUDAL; PERINEURAL AS NEEDED
Status: DISCONTINUED | OUTPATIENT
Start: 2020-06-25 | End: 2020-06-25 | Stop reason: HOSPADM

## 2020-06-25 RX ORDER — SODIUM CHLORIDE 0.9 % (FLUSH) 0.9 %
5-40 SYRINGE (ML) INJECTION EVERY 8 HOURS
Status: DISCONTINUED | OUTPATIENT
Start: 2020-06-25 | End: 2020-06-25 | Stop reason: HOSPADM

## 2020-06-25 RX ORDER — ATROPINE SULFATE 0.1 MG/ML
0.5 INJECTION INTRAVENOUS
Status: DISCONTINUED | OUTPATIENT
Start: 2020-06-25 | End: 2020-06-25 | Stop reason: HOSPADM

## 2020-06-25 RX ORDER — EPINEPHRINE 0.1 MG/ML
1 INJECTION INTRACARDIAC; INTRAVENOUS
Status: DISCONTINUED | OUTPATIENT
Start: 2020-06-25 | End: 2020-06-25 | Stop reason: HOSPADM

## 2020-06-25 RX ORDER — FLUMAZENIL 0.1 MG/ML
0.2 INJECTION INTRAVENOUS
Status: DISCONTINUED | OUTPATIENT
Start: 2020-06-25 | End: 2020-06-25 | Stop reason: HOSPADM

## 2020-06-25 RX ORDER — MIDAZOLAM HYDROCHLORIDE 1 MG/ML
.25-5 INJECTION, SOLUTION INTRAMUSCULAR; INTRAVENOUS
Status: DISCONTINUED | OUTPATIENT
Start: 2020-06-25 | End: 2020-06-25 | Stop reason: HOSPADM

## 2020-06-25 RX ORDER — FENTANYL CITRATE 50 UG/ML
25 INJECTION, SOLUTION INTRAMUSCULAR; INTRAVENOUS
Status: DISCONTINUED | OUTPATIENT
Start: 2020-06-25 | End: 2020-06-25 | Stop reason: HOSPADM

## 2020-06-25 RX ORDER — SODIUM CHLORIDE 0.9 % (FLUSH) 0.9 %
5-40 SYRINGE (ML) INJECTION AS NEEDED
Status: DISCONTINUED | OUTPATIENT
Start: 2020-06-25 | End: 2020-06-25 | Stop reason: HOSPADM

## 2020-06-25 RX ORDER — PROPOFOL 10 MG/ML
INJECTION, EMULSION INTRAVENOUS AS NEEDED
Status: DISCONTINUED | OUTPATIENT
Start: 2020-06-25 | End: 2020-06-25 | Stop reason: HOSPADM

## 2020-06-25 RX ORDER — NALOXONE HYDROCHLORIDE 0.4 MG/ML
0.4 INJECTION, SOLUTION INTRAMUSCULAR; INTRAVENOUS; SUBCUTANEOUS
Status: DISCONTINUED | OUTPATIENT
Start: 2020-06-25 | End: 2020-06-25 | Stop reason: HOSPADM

## 2020-06-25 RX ORDER — SODIUM CHLORIDE 9 MG/ML
75 INJECTION, SOLUTION INTRAVENOUS CONTINUOUS
Status: DISCONTINUED | OUTPATIENT
Start: 2020-06-25 | End: 2020-06-25 | Stop reason: HOSPADM

## 2020-06-25 RX ORDER — DEXTROMETHORPHAN/PSEUDOEPHED 2.5-7.5/.8
1.2 DROPS ORAL
Status: DISCONTINUED | OUTPATIENT
Start: 2020-06-25 | End: 2020-06-25 | Stop reason: HOSPADM

## 2020-06-25 RX ADMIN — PROPOFOL 40 MG: 10 INJECTION, EMULSION INTRAVENOUS at 09:41

## 2020-06-25 RX ADMIN — PROPOFOL 50 MG: 10 INJECTION, EMULSION INTRAVENOUS at 09:39

## 2020-06-25 RX ADMIN — PROPOFOL 40 MG: 10 INJECTION, EMULSION INTRAVENOUS at 09:44

## 2020-06-25 RX ADMIN — PROPOFOL 30 MG: 10 INJECTION, EMULSION INTRAVENOUS at 09:50

## 2020-06-25 RX ADMIN — SODIUM CHLORIDE 75 ML/HR: 900 INJECTION, SOLUTION INTRAVENOUS at 09:03

## 2020-06-25 RX ADMIN — PROPOFOL 20 MG: 10 INJECTION, EMULSION INTRAVENOUS at 09:54

## 2020-06-25 RX ADMIN — PROPOFOL 40 MG: 10 INJECTION, EMULSION INTRAVENOUS at 09:46

## 2020-06-25 RX ADMIN — PROPOFOL 30 MG: 10 INJECTION, EMULSION INTRAVENOUS at 09:56

## 2020-06-25 RX ADMIN — LIDOCAINE HYDROCHLORIDE 40 MG: 20 INJECTION, SOLUTION EPIDURAL; INFILTRATION; INTRACAUDAL; PERINEURAL at 09:30

## 2020-06-25 RX ADMIN — PROPOFOL 30 MG: 10 INJECTION, EMULSION INTRAVENOUS at 09:34

## 2020-06-25 RX ADMIN — PROPOFOL 20 MG: 10 INJECTION, EMULSION INTRAVENOUS at 09:37

## 2020-06-25 RX ADMIN — PROPOFOL 30 MG: 10 INJECTION, EMULSION INTRAVENOUS at 09:32

## 2020-06-25 RX ADMIN — PROPOFOL 50 MG: 10 INJECTION, EMULSION INTRAVENOUS at 09:31

## 2020-06-25 NOTE — ROUTINE PROCESS
Kely Olvera  1955  [id number]    Situation:  Verbal report received from: Katalina Wiggins  Procedure: Procedure(s):  COLONOSCOPY, EGD  ESOPHAGOGASTRODUODENOSCOPY (EGD)  ESOPHAGEAL DILATION  ESOPHAGOGASTRODUODENAL (EGD) BIOPSY    Background:    Preoperative diagnosis: PERSONAL HISTORY OF COLON CANCER AND DYSPHAGIA  Postoperative diagnosis: EGD: Esophagitis, Shiatzki Ring  Colon: Diverticulosis, small internal hemorrhoids    :  Dr. Jose Headley  Assistant(s): Endoscopy Technician-1: Va Hinojosa  Endoscopy RN-1: Deshawn Jerome    Specimens:   ID Type Source Tests Collected by Time Destination   1 : [place] Biopsy Preservative   Jae Costa MD 6/25/2020 0935 Pathology     H. Pylori  no    Assessment:  Intra-procedure medications     Anesthesia gave intra-procedure sedation and medications, see anesthesia flow sheet yes    Intravenous fluids: NS@ KVO     Vital signs stable     Abdominal assessment: round and soft     Recommendation:  Discharge patient per MD order.   Return to floor  Family or Friend   Permission to share finding with family or friend yes

## 2020-06-25 NOTE — DISCHARGE INSTRUCTIONS
Malina Parker  [id number]  1955    EGD/COLON DISCHARGE INSTRUCTIONS  Discomfort:  Redness at IV site- apply warm compress to area; if redness or soreness persist- contact your physician  There may be a slight amount of blood passed from the rectum  Gaseous discomfort- walking, belching will help relieve any discomfort  You may not operate a vehicle for 12 hours  You may not engage in an occupation involving machinery or appliances for rest of today  You may not drink alcoholic beverages for at least 12 hours  Avoid making any critical decisions for at least 24 hour  DIET:   High fiber diet. - however -  remember your colon is empty and a heavy meal will produce gas. Avoid these foods:  vegetables, fried / greasy foods, carbonated drinks for today  MEDICATION:         ACTIVITY:  You may not resume your normal daily activities until tomorrow AM; it is recommended that you spend the remainder of the day resting -  avoid any strenuous activity. CALL M.D.   ANY SIGN OF:   Increasing pain, nausea, vomiting  Abdominal distension (swelling)  New increased bleeding (oral or rectal)  Fever (chills)  Pain in chest area  Bloody discharge from nose or mouth  Shortness of breath    IMPRESSION:  -Esophageal ring consistent with Schatzki ring in distal esophagus; dilated with 54FR Savary dilator over wire with site examined afterwards  -LA Grade A distal esophagitis; biopsied  -Normal stomach mucosa  -Normal duodenal mucosa  -Sigmoid diverticulosis  -Small grade 1 internal hemorrhoids  -No colon masses, polyps, or inflammation noted    Follow-up Instructions:   Call Dr. Zara Yeung for the results of procedure / biopsy in 7-10 days  Telephone # [phone]  Begin using omeprazole to assess for symptom relief  Repeat colonoscopy in 5 years    Srikanth Block MD

## 2020-06-25 NOTE — ANESTHESIA POSTPROCEDURE EVALUATION
Procedure(s):  COLONOSCOPY, EGD  ESOPHAGOGASTRODUODENOSCOPY (EGD)  ESOPHAGEAL DILATION  ESOPHAGOGASTRODUODENAL (EGD) BIOPSY. total IV anesthesia    Anesthesia Post Evaluation        Patient location during evaluation: PACU  Note status: Adequate. Level of consciousness: responsive to verbal stimuli and sleepy but conscious  Pain management: satisfactory to patient  Airway patency: patent  Anesthetic complications: no  Cardiovascular status: acceptable  Respiratory status: acceptable  Hydration status: acceptable  Comments: +Post-Anesthesia Evaluation and Assessment    Patient: Fatoumata Pastor MRN: [id number]  SSN: [id number]   YOB: 1955  Age: 75 y.o. Sex: female      Cardiovascular Function/Vital Signs    /65   Pulse 67   Temp 36.6 °C (97.8 °F)   Resp 15   Ht 5' 5\" (1.651 m)   Wt 70.7 kg (155 lb 12.8 oz)   SpO2 98%   Breastfeeding No   BMI 25.93 kg/m²     Patient is status post Procedure(s):  COLONOSCOPY, EGD  ESOPHAGOGASTRODUODENOSCOPY (EGD)  ESOPHAGEAL DILATION  ESOPHAGOGASTRODUODENAL (EGD) BIOPSY. Nausea/Vomiting: Controlled. Postoperative hydration reviewed and adequate. Pain:  Pain Scale 1: Numeric (0 - 10) (06/25/20 1019)  Pain Intensity 1: 0 (06/25/20 1019)   Managed. Neurological Status: At baseline. Mental Status and Level of Consciousness: Arousable. Pulmonary Status:   O2 Device: Room air (06/25/20 1019)   Adequate oxygenation and airway patent. Complications related to anesthesia: None    Post-anesthesia assessment completed. No concerns. Signed By: Yury Sanchez DO    6/25/2020  Post anesthesia nausea and vomiting:  controlled      INITIAL Post-op Vital signs:   Vitals Value Taken Time   /72 6/25/2020 10:27 AM   Temp 36.6 °C (97.8 °F) 6/25/2020 10:12 AM   Pulse 0 6/25/2020 10:28 AM   Resp 0 6/25/2020 10:28 AM   SpO2 99 % 6/25/2020 10:27 AM   Vitals shown include unvalidated device data.

## 2020-06-25 NOTE — ANESTHESIA PREPROCEDURE EVALUATION
Relevant Problems   No relevant active problems       Anesthetic History   No history of anesthetic complications            Review of Systems / Medical History  Patient summary reviewed, nursing notes reviewed and pertinent labs reviewed    Pulmonary  Within defined limits                 Neuro/Psych   Within defined limits           Cardiovascular    Hypertension          Hyperlipidemia    Exercise tolerance: >4 METS  Comments: 2015 EKG:  NSR   GI/Hepatic/Renal               Comments: Colon cancer, dysphagia Endo/Other             Other Findings   Comments: Osteopenia    Prediabetes    Arthropathy of cervical facet joint           Physical Exam    Airway  Mallampati: I  TM Distance: 4 - 6 cm  Neck ROM: normal range of motion   Mouth opening: Normal     Cardiovascular  Regular rate and rhythm,  S1 and S2 normal,  no murmur, click, rub, or gallop             Dental    Dentition: Bridges and Caps/crowns  Comments: None in the front   Pulmonary  Breath sounds clear to auscultation               Abdominal  GI exam deferred       Other Findings            Anesthetic Plan    ASA: 2  Anesthesia type: total IV anesthesia          Induction: Intravenous  Anesthetic plan and risks discussed with: Patient      Took BP med this am

## 2020-06-25 NOTE — PROCEDURES
NAME:  Sergey Console   :   1955   MRN:   [id number]     Date/Time:  2020 10:15 AM    Colonoscopy Operative Report    Procedure Type:   Colonoscopy --screening     Indications:     Personal history of colon cancer (screening only)  Pre-operative Diagnosis: see indication above  Post-operative Diagnosis:  See findings below  :  Yanira Loaiza MD  Referring Provider: Liam Strong MD    Exam:  Airway: clear, no airway problems anticipated  Heart: RRR, without gallops or rubs  Lungs: clear bilaterally without wheezes, crackles, or rhonchi  Abdomen: soft, nontender, nondistended, bowel sounds present  Mental Status: awake, alert and oriented to person, place and time    Sedation:  MAC anesthesia Propofol 380mg IV  Procedure Details:  After informed consent was obtained with all risks and benefits of procedure explained and preoperative exam completed, the patient was taken to the endoscopy suite and placed in the left lateral decubitus position. Upon sequential sedation as per above, a digital rectal exam was performed demonstrating internal hemorrhoids. The Olympus videocolonoscope  was inserted in the rectum and carefully advanced to the cecum, which was identified by the ileocecal valve and appendiceal orifice. The quality of preparation was adequate. The colonoscope was slowly withdrawn with careful evaluation between folds. Retroflexion in the rectum was completed demonstrating internal hemorrhoids. Findings:     -Sigmoid diverticulosis  -Small grade 1 internal hemorrhoids  -No colon masses, polyps, or inflammation noted    Specimen Removed:  None  Complications: None. EBL:  None. Impression:    -Sigmoid diverticulosis  -Small grade 1 internal hemorrhoids  -No colon masses, polyps, or inflammation noted    Recommendations: --Repeat colonoscopy in 5 years. High fiber diet. Resume normal medication(s).          Discharge Disposition:  Home in the company of a  when able to ambulate.     Tamela Fields MD

## 2020-06-25 NOTE — PERIOP NOTES
Endoscope was pre-cleaned at bedside immediately following procedure by ARCADIO Angeles   Medications     Medication Rate/Dose/Volume Action Route Date Time   Administering User Audit    lidocaine (PF) 2% (mg) 40 mg Given IntraVENous 06/25/20  0930  Kristine Ignacio, CRNA     propofol 10 mg/mL (mg) 50 mg Given IntraVENous 06/25/20  0931  Kristine Ignacio, CRNA      30 mg Given IntraVENous  0932  Kristine Ignacio, CRNA      30 mg Given IntraVENous  5442  Kristine Ignacio, CRNA      20 mg Given IntraVENous  9766  Kristine Ignacio, CRNA      50 mg Given IntraVENous  9730  Kristine Ignacio, CRNA      40 mg Given IntraVENous  5295  Kristine Ignacio, CRNA      40 mg Given IntraVENous  8568  Kristine Ignacio, CRNA edited     40 mg Given IntraVENous  8375  Kristine Ignacio, CRNA      30 mg Given IntraVENous  9445  Kristine Ignacio, CRNA      20 mg Given IntraVENous  9148  Kristine Ignacio, CRNA      30 mg Given IntraVENous  5663  Kristine Ignacio, CRNA     0.9% sodium chloride infusion (mL) 500 mL Anesthesia Volume Adjustment IntraVENous 06/25/20  0958  Kristine Ignacio, CRNA

## 2020-06-25 NOTE — PROCEDURES
NAME:  Monico Carpenter   :   1955   MRN:   [id number]     Date/Time:  2020 10:11 AM    Esophagogastroduodenoscopy (EGD) Procedure Note    Procedure: Esophagogastroduodenoscopy with biopsy, esophageal dilation    Indication:  Dysphagia/odynophagia  Pre-operative Diagnosis: see indication above  Post-operative Diagnosis: see findings below  :  Norris Villarreal MD  Referring Provider:   Selina Armas MD    Exam:  Airway: clear, no airway problems anticipated  Heart: RRR, without gallops or rubs  Lungs: clear bilaterally without wheezes, crackles, or rhonchi  Abdomen: soft, nontender, nondistended, bowel sounds present  Mental Status: awake, alert and oriented to person, place and time     Anethesia/Sedation:  MAC anesthesia Propofol as per colonoscopy  Procedure Details   After informed consent was obtained for the procedure, with all risks and benefits of procedure explained the patient was taken to the endoscopy suite and placed in the left lateral decubitus position. Following sequential administration of sedation as per above, the [id number] gastroscope was inserted into the mouth and advanced under direct vision to second portion of the duodenum. A careful inspection was made as the gastroscope was withdrawn, including a retroflexed view of the proximal stomach; findings and interventions are described below. Findings:    -Esophageal ring consistent with Schatzki ring in distal esophagus; dilated with 54FR Savary dilator over wire with site examined afterward  -LA Grade A distal esophagitis; biopsied  -Normal stomach mucosa  -Normal duodenal mucosa    Therapies:  esophageal dilation with savary sizes 54FR; biopsy of esophagus  Specimens: #1 g-e jxn  EBL:  None. Complications:   None; patient tolerated the procedure well.            Impression:    -Esophageal ring consistent with Schatzki ring in distal esophagus; dilated with 54FR Savary dilator over wire with site examined afterwards  -LA Grade A distal esophagitis; biopsied  -Normal stomach mucosa  -Normal duodenal mucosa    Recommendations:  -Acid suppression with a proton pump inhibitor. , -Await pathology. , -Follow symptoms.     Discharge disposition:  Home in the company of  when able to ambulate after colonoscopy completed    Huy Geller MD

## 2020-06-25 NOTE — H&P
Gastroenterology Outpatient History and Physical    Patient: Sandra President    Physician: Vannesa Magdaleno MD    Chief Complaint: dysphagia, pers hx colon adenocarcinoma  History of Present Illness: 71yo F with dysphagia and pers hx colon adenocarcinoma. Last colonoscopy 2015. History:  Past Medical History:   Diagnosis Date    Arthropathy of cervical facet joint     Cancer ([address] 2006    colon    Carotid atherosclerosis 9/2012    High cholesterol     Hypertension 2007    Osteopenia 9/2012    Prediabetes       Past Surgical History:   Procedure Laterality Date    [place] COLSC FLX WITH DIRECTED SUBMUCOSAL [address] ANY SBST  2006    colonoscopy Q 3 years      Social History     Socioeconomic History    Marital status:      Spouse name: Not on file    Number of children: 2    Years of education: Not on file    Highest education level: Not on file   Occupational History    Occupation:    Tobacco Use    Smoking status: Never Smoker    Smokeless tobacco: Never Used   Substance and Sexual Activity    Alcohol use: Yes     Comment: rarely    Drug use: No    Sexual activity: Not Currently   Other Topics Concern     Service No    Blood Transfusions No    Caffeine Concern No    Occupational Exposure No    Hobby Hazards No    Sleep Concern No    Stress Concern Yes     Comment: Patient is working on ways to self-calm    Weight Concern No    Special Diet No    Back Care No    Exercise Yes    Seat Belt Yes    Self-Exams Yes   Social History Narrative    , , 2 children      Family History   Problem Relation Age of Onset    Hypertension Mother     Diabetes Mother     Hypertension Father     Stroke Father       Patient Active Problem List   Diagnosis Code    High cholesterol E78.00    Prediabetes R73.03    Essential hypertension I10       Allergies:    Allergies   Allergen Reactions    Cephalexin Rash     Medications:   Prior to Admission medications    Medication Sig Start Date End Date Taking? Authorizing Provider   enalapril-hydroCHLOROthiazide (VASERETIC) 10-25 mg tablet take 1 tablet by mouth once daily 9/1/18  Yes Barrie Mora MD   FOLIC ACID/MV,FE,OTHER MIN (CENTRUM PO) Take  by mouth. Yes Provider, Historical   calcium carb-vit d2-minerals (CALTRATE PLUS) Tab Take 1 Tab by mouth two (2) times a day. 9/17/12  Yes Rick Orozco, DEYSI   omega-3 fatty acids-vitamin e (FISH OIL) 1,000 mg Cap Take 1 Cap by mouth. Yes Provider, Historical   cranberry 500 mg capsule Take 500 mg by mouth daily. Yes Provider, Historical   clobetasol (TEMOVATE) 0.05 % topical cream  12/7/17   Provider, Historical   BOOSTRIX TDAP 2.5-8-5 Lf-mcg-Lf/0.5mL syrg inject 0.5 milliliter intramuscularly 6/7/17   Provider, Historical   JUBLIA pamela topical solution  6/9/17   Provider, Historical   aspirin ([employer] ASPIRIN) 81 mg chewable tablet Take 1 Tab by mouth daily. 8/8/17   Rick Orozco, DEYSI   melatonin 3 mg tablet Take  by mouth. Provider, Historical     Physical Exam:   Vital Signs: Blood pressure 147/66, pulse 82, temperature 97.9 °F (36.6 °C), resp. rate 20, height 5' 5\" (1.651 m), weight 70.7 kg (155 lb 12.8 oz), last menstrual period 06/20/2005, SpO2 100 %, not currently breastfeeding.   General: well developed, well nourished   HEENT: unremarkable   Heart: regular rhythm no mumur    Lungs: clear   Abdominal:  benign   Neurological: unremarkable   Extremities: no edema     Findings/Diagnosis: dysphagia, pers hx colon adenocarcinoma  Plan of Care/Planned Procedure: EGD with bx and dil and colonoscopy with conscious/deep sedation    Signed:  Alex Carrero MD 6/25/2020

## 2021-05-19 ENCOUNTER — TRANSCRIBE ORDER (OUTPATIENT)
Dept: SCHEDULING | Age: 66
End: 2021-05-19

## 2021-05-19 DIAGNOSIS — R09.89 BRUIT OF RIGHT CAROTID ARTERY: Primary | ICD-10-CM

## 2021-05-25 ENCOUNTER — HOSPITAL ENCOUNTER (OUTPATIENT)
Dept: VASCULAR SURGERY | Age: 66
Discharge: HOME | End: 2021-05-25
Payer: MEDICARE

## 2021-05-25 DIAGNOSIS — R09.89 BRUIT OF RIGHT CAROTID ARTERY: ICD-10-CM

## 2021-05-25 DIAGNOSIS — I65.23 BILATERAL CAROTID ARTERY STENOSIS: ICD-10-CM

## 2021-05-25 LAB
LEFT CCA DIST DIAS: 22.5 CM/S
LEFT CCA DIST SYS: 83.8 CM/S
LEFT CCA PROX DIAS: 14.9 CM/S
LEFT CCA PROX SYS: 103.6 CM/S
LEFT ECA DIAS: 0 CM/S
LEFT ECA SYS: 78.5 CM/S
LEFT ICA DIST DIAS: 27.8 CM/S
LEFT ICA DIST SYS: 77.2 CM/S
LEFT ICA MID DIAS: 43.2 CM/S
LEFT ICA MID SYS: 120 CM/S
LEFT ICA PROX DIAS: 37 CM/S
LEFT ICA PROX SYS: 121 CM/S
LEFT ICA/CCA SYS: 1.44
LEFT SUBCLAVIAN DIAS: 0 CM/S
LEFT SUBCLAVIAN SYS: 127.2 CM/S
LEFT VERTEBRAL DIAS: 18.58 CM/S
LEFT VERTEBRAL SYS: 61.4 CM/S
RIGHT CCA DIST DIAS: 17.5 CM/S
RIGHT CCA DIST SYS: 84.6 CM/S
RIGHT CCA PROX DIAS: 25.5 CM/S
RIGHT CCA PROX SYS: 138.7 CM/S
RIGHT ECA DIAS: 10.46 CM/S
RIGHT ECA SYS: 72.6 CM/S
RIGHT ICA DIST DIAS: 25.2 CM/S
RIGHT ICA DIST SYS: 78.6 CM/S
RIGHT ICA MID DIAS: 31.3 CM/S
RIGHT ICA MID SYS: 96.4 CM/S
RIGHT ICA PROX DIAS: 23.5 CM/S
RIGHT ICA PROX SYS: 72.7 CM/S
RIGHT ICA/CCA SYS: 1.1
RIGHT SUBCLAVIAN DIAS: 0 CM/S
RIGHT SUBCLAVIAN SYS: 226 CM/S
RIGHT VERTEBRAL DIAS: 18.4 CM/S
RIGHT VERTEBRAL SYS: 56.6 CM/S

## 2021-05-25 PROCEDURE — 93880 EXTRACRANIAL BILAT STUDY: CPT | Performed by: PSYCHIATRY & NEUROLOGY

## 2021-05-25 PROCEDURE — 93880 EXTRACRANIAL BILAT STUDY: CPT

## 2022-03-18 PROBLEM — R73.03 PREDIABETES: Status: ACTIVE | Noted: 2017-08-08

## 2022-03-19 PROBLEM — I10 ESSENTIAL HYPERTENSION: Status: ACTIVE | Noted: 2018-02-06

## 2023-05-10 RX ORDER — CLOBETASOL PROPIONATE 0.5 MG/G
CREAM TOPICAL
COMMUNITY
Start: 2017-12-07

## 2023-05-10 RX ORDER — ASPIRIN 81 MG/1
81 TABLET, CHEWABLE ORAL DAILY
COMMUNITY
Start: 2017-08-08

## 2023-05-10 RX ORDER — TETANUS TOXOID, REDUCED DIPHTHERIA TOXOID AND ACELLULAR PERTUSSIS VACCINE, ADSORBED 5; 2.5; 8; 8; 2.5 [IU]/.5ML; [IU]/.5ML; UG/.5ML; UG/.5ML; UG/.5ML
SUSPENSION INTRAMUSCULAR
COMMUNITY
Start: 2017-06-07

## 2023-05-10 RX ORDER — ENALAPRIL MALEATE AND HYDROCHLOROTHIAZIDE 10; 25 MG/1; MG/1
1 TABLET ORAL DAILY
COMMUNITY
Start: 2018-09-01

## 2023-05-10 RX ORDER — EFINACONAZOLE 100 MG/ML
SOLUTION TOPICAL
COMMUNITY
Start: 2017-06-09

## 2023-05-10 RX ORDER — LANOLIN ALCOHOL/MO/W.PET/CERES
CREAM (GRAM) TOPICAL
COMMUNITY

## 2024-06-13 ENCOUNTER — ANESTHESIA EVENT (OUTPATIENT)
Facility: HOSPITAL | Age: 69
End: 2024-06-13
Payer: MEDICARE

## 2024-06-13 ENCOUNTER — HOSPITAL ENCOUNTER (OUTPATIENT)
Facility: HOSPITAL | Age: 69
Setting detail: OUTPATIENT SURGERY
Discharge: HOME | End: 2024-06-13
Attending: INTERNAL MEDICINE | Admitting: INTERNAL MEDICINE
Payer: MEDICARE

## 2024-06-13 ENCOUNTER — ANESTHESIA (OUTPATIENT)
Facility: HOSPITAL | Age: 69
End: 2024-06-13
Payer: MEDICARE

## 2024-06-13 VITALS
OXYGEN SATURATION: 98 % | WEIGHT: 171.3 LBS | HEIGHT: 64 IN | HEART RATE: 74 BPM | TEMPERATURE: 98.1 F | BODY MASS INDEX: 29.24 KG/M2 | SYSTOLIC BLOOD PRESSURE: 143 MMHG | RESPIRATION RATE: 14 BRPM | DIASTOLIC BLOOD PRESSURE: 86 MMHG

## 2024-06-13 PROCEDURE — 3700000000 HC ANESTHESIA ATTENDED CARE: Performed by: INTERNAL MEDICINE

## 2024-06-13 PROCEDURE — 6360000002 HC RX W HCPCS: Performed by: NURSE ANESTHETIST, CERTIFIED REGISTERED

## 2024-06-13 PROCEDURE — 2580000003 HC RX 258: Performed by: INTERNAL MEDICINE

## 2024-06-13 PROCEDURE — 7100000010 HC PHASE II RECOVERY - FIRST 15 MIN: Performed by: INTERNAL MEDICINE

## 2024-06-13 PROCEDURE — 2709999900 HC NON-CHARGEABLE SUPPLY: Performed by: INTERNAL MEDICINE

## 2024-06-13 PROCEDURE — 3600007502: Performed by: INTERNAL MEDICINE

## 2024-06-13 PROCEDURE — C1769 GUIDE WIRE: HCPCS | Performed by: INTERNAL MEDICINE

## 2024-06-13 PROCEDURE — 3700000001 HC ADD 15 MINUTES (ANESTHESIA): Performed by: INTERNAL MEDICINE

## 2024-06-13 PROCEDURE — 88305 TISSUE EXAM BY PATHOLOGIST: CPT

## 2024-06-13 PROCEDURE — 3600007512: Performed by: INTERNAL MEDICINE

## 2024-06-13 PROCEDURE — 2500000003 HC RX 250 WO HCPCS: Performed by: NURSE ANESTHETIST, CERTIFIED REGISTERED

## 2024-06-13 RX ORDER — SODIUM CHLORIDE 0.9 % (FLUSH) 0.9 %
5-40 SYRINGE (ML) INJECTION EVERY 12 HOURS SCHEDULED
Status: DISCONTINUED | OUTPATIENT
Start: 2024-06-13 | End: 2024-06-13 | Stop reason: HOSPADM

## 2024-06-13 RX ORDER — SODIUM CHLORIDE 0.9 % (FLUSH) 0.9 %
5-40 SYRINGE (ML) INJECTION PRN
Status: DISCONTINUED | OUTPATIENT
Start: 2024-06-13 | End: 2024-06-13 | Stop reason: HOSPADM

## 2024-06-13 RX ORDER — HYDROCHLOROTHIAZIDE 25 MG/1
25 TABLET ORAL DAILY
COMMUNITY
Start: 2024-03-26

## 2024-06-13 RX ORDER — ENALAPRIL MALEATE 10 MG/1
TABLET ORAL
COMMUNITY
Start: 2024-03-27

## 2024-06-13 RX ORDER — ENALAPRIL MALEATE 20 MG/1
20 TABLET ORAL DAILY
COMMUNITY
Start: 2024-03-26

## 2024-06-13 RX ORDER — FLUTICASONE PROPIONATE 50 MCG
2 SPRAY, SUSPENSION (ML) NASAL DAILY
COMMUNITY
Start: 2024-03-08

## 2024-06-13 RX ORDER — SODIUM CHLORIDE 9 MG/ML
25 INJECTION, SOLUTION INTRAVENOUS PRN
Status: DISCONTINUED | OUTPATIENT
Start: 2024-06-13 | End: 2024-06-13 | Stop reason: HOSPADM

## 2024-06-13 RX ORDER — ATORVASTATIN CALCIUM 10 MG/1
1 TABLET, FILM COATED ORAL DAILY
COMMUNITY

## 2024-06-13 RX ORDER — DOXYCYCLINE 100 MG/1
CAPSULE ORAL
COMMUNITY
Start: 2024-06-06

## 2024-06-13 RX ADMIN — SODIUM CHLORIDE 25 ML: 9 INJECTION, SOLUTION INTRAVENOUS at 10:35

## 2024-06-13 RX ADMIN — PROPOFOL 150 MG: 10 INJECTION, EMULSION INTRAVENOUS at 11:11

## 2024-06-13 RX ADMIN — LIDOCAINE HYDROCHLORIDE 100 MG: 20 INJECTION, SOLUTION EPIDURAL; INFILTRATION; INTRACAUDAL; PERINEURAL at 11:01

## 2024-06-13 RX ADMIN — SODIUM CHLORIDE: 9 INJECTION, SOLUTION INTRAVENOUS at 11:13

## 2024-06-13 ASSESSMENT — PAIN - FUNCTIONAL ASSESSMENT: PAIN_FUNCTIONAL_ASSESSMENT: NONE - DENIES PAIN

## 2024-06-13 NOTE — H&P
Gastroenterology Outpatient History and Physical    Patient: Brianda Adamesford    Physician: Aime Payan MD    Chief Complaint: dysphagia  History of Present Illness: 67yo F with dysphagia    History:  Past Medical History:   Diagnosis Date    Arthropathy of cervical facet joint     Cancer (HCC) 2006    colon    Carotid atherosclerosis 9/2012    High cholesterol     Hypertension 2007    Osteopenia 9/2012    Prediabetes       Past Surgical History:   Procedure Laterality Date    COLONOSCOPY N/A 6/25/2020    COLONOSCOPY, EGD performed by Aime Payan MD at \[facility]\"" ENDOSCOPY    COLONOSCOPY,DIAGNOSTIC  6/25/2020         COLSC FLX WITH DIRECTED SUBMUCOSAL NJX ANY SBST  2006    colonoscopy Q 3 years    UPPER GI ENDOSCOPY,BIOPSY  6/25/2020         UPPER GI ENDOSCOPY,DILATN W GUIDE  6/25/2020           Social History     Socioeconomic History    Marital status:      Spouse name: None    Number of children: None    Years of education: None    Highest education level: None   Tobacco Use    Smoking status: Never    Smokeless tobacco: Never   Substance and Sexual Activity    Alcohol use: Yes    Drug use: No   Social History Narrative    , , 2 children      Family History   Problem Relation Age of Onset    Stroke Father     Hypertension Father     Diabetes Mother     Hypertension Mother       Patient Active Problem List   Diagnosis    Prediabetes    Essential hypertension    High cholesterol       Allergies:   Allergies   Allergen Reactions    Cephalexin Rash     Medications:   Prior to Admission medications    Medication Sig Start Date End Date Taking? Authorizing Provider   hydroCHLOROthiazide (HYDRODIURIL) 25 MG tablet Take 1 tablet by mouth daily 3/26/24  Yes ProviderMorris MD   enalapril (VASOTEC) 10 MG tablet TAKE 1 TABLET BY MOUTH ONCE A DAY WITH ENALAPRIL 20MG 3/27/24  Yes Morris Ferris MD   enalapril (VASOTEC) 20 MG tablet Take 1 tablet by mouth daily 3/26/24   Yes Provider, MD Morris   doxycycline monohydrate (MONODOX) 100 MG capsule TAKE 1 CAPSULE BY MOUTH TWICE DAILY FOR 10 DAYS 6/6/24  Yes Provider, MD Morris   fluticasone (FLONASE) 50 MCG/ACT nasal spray 2 sprays by Each Nostril route daily Shake liquid 3/8/24  Yes Provider, MD Morris   atorvastatin (LIPITOR) 10 MG tablet Take 1 tablet by mouth daily    Provider, MD Morris   aspirin 81 MG chewable tablet Take 1 tablet by mouth daily 8/8/17   Automatic Reconciliation, Ar   clobetasol (TEMOVATE) 0.05 % cream ceived the following from [facility]: Outside name: clobetasol (TEMOVATE) 0.05 % topical cream 12/7/17   Automatic Reconciliation, Ar   Efinaconazole (JUBLIA) 10 % SOLN ceived the following from [facility]: Outside name: JUBLIA peter topical solution 6/9/17   Automatic Reconciliation, Ar   melatonin 3 MG TABS tablet Take by mouth    Automatic Reconciliation, Ar     Physical Exam:   Vital Signs: Blood pressure (!) 149/65, pulse 75, temperature 98 °F (36.7 °C), temperature source Temporal, resp. rate 12, height 1.626 m (5' 4\"), weight 77.7 kg (171 lb 4.8 oz), SpO2 97 %.  General: well developed, well nourished   HEENT: unremarkable   Heart: regular rhythm no mumur    Lungs: clear   Abdominal:  benign   Neurological: unremarkable   Extremities: no edema     Findings/Diagnosis: dysphagia  Plan of Care/Planned Procedure: EGD with conscious/deep sedation    Signed:  Aime Payan MD 6/13/2024

## 2024-06-13 NOTE — PROGRESS NOTES
Endoscopy Case End Note:    1112:  Procedure scope was pre-cleaned, per protocol, at bedside by Nori CRONIN RN.      1113:  Report received from anesthesia - Vane DAMON CRNA.  See anesthesia flowsheet for intra-procedure vital signs and events.

## 2024-06-13 NOTE — ANESTHESIA PRE PROCEDURE
Department of Anesthesiology  Preprocedure Note       Name:  Brianda Phillips   Age:  68 y.o.  :  1955                                          MRN:  [id number]         Date:  2024      Surgeon: Surgeon(s):  Aime Payan MD    Procedure: Procedure(s):  ESOPHAGOGASTRODUODENOSCOPY BIOPSY    Medications prior to admission:   Prior to Admission medications    Medication Sig Start Date End Date Taking? Authorizing Provider   aspirin 81 MG chewable tablet Take 1 tablet by mouth daily 17   Automatic Reconciliation, Ar   clobetasol (TEMOVATE) 0.05 % cream ceived the following from [facility]: Outside name: clobetasol (TEMOVATE) 0.05 % topical cream 17   Automatic Reconciliation, Ar   Efinaconazole (JUBLIA) 10 % SOLN ceived the following from [facility]: Outside name: JUBLIA peter topical solution 17   Automatic Reconciliation, Ar   enalapril-hydroCHLOROthiazide (VASERETIC) 10-25 MG per tablet Take 1 tablet by mouth daily 18   Automatic Reconciliation, Ar   melatonin 3 MG TABS tablet Take by mouth    Automatic Reconciliation, Ar   tetanus-diphth-acell pertussis (BOOSTRIX) 5-2.5-18.5 LF-MCG/0.5 ELENA injection inject 0.5 milliliter intramuscularly 17   Automatic Reconciliation, Ar       Current medications:    No current facility-administered medications for this encounter.     Current Outpatient Medications   Medication Sig Dispense Refill   • aspirin 81 MG chewable tablet Take 1 tablet by mouth daily     • clobetasol (TEMOVATE) 0.05 % cream ceived the following from [facility]: Outside name: clobetasol (TEMOVATE) 0.05 % topical cream     • Efinaconazole (JUBLIA) 10 % SOLN ceived the following from [facility]: Outside name: JUBLIA peter topical solution     • enalapril-hydroCHLOROthiazide (VASERETIC) 10-25 MG per tablet Take 1 tablet by mouth daily     • melatonin 3 MG TABS tablet Take by mouth     • tetanus-diphth-acell

## 2024-06-13 NOTE — PROGRESS NOTES
ARRIVAL INFORMATION:  Verified patient name and date of birth, scheduled procedure, and informed consent.     : Gil () contact number: [phone]  Physician and staff can share information with the .     Belongings with patient include:  Clothing,Glasses    GI FOCUSED ASSESSMENT:  Neuro: Awake, alert, oriented x4  Respiratory: even and unlabored   GI: soft and non-distended  EKG Rhythm: normal sinus rhythm    Education:Reviewed general discharge instructions and  information.      The risks and benefits of the bite block have been explained to patient.  Patient verbalizes understanding.

## 2024-06-13 NOTE — ANESTHESIA POSTPROCEDURE EVALUATION
Department of Anesthesiology  Postprocedure Note    Patient: Brianda Phillips  MRN: [id number]  YOB: 1955  Date of evaluation: 6/13/2024    Procedure Summary       Date: 06/13/24 Room / Location: [facility] ENDO 02 / [facility] ENDOSCOPY    Anesthesia Start: 1101 Anesthesia Stop: 1113    Procedures:       ESOPHAGOGASTRODUODENOSCOPY BIOPSY      ESOPHAGOGASTRODUODENOSCOPY DILATION SAVORY Diagnosis:       Dysphagia, unspecified type      Schatzki's ring of distal esophagus      Hiatal hernia      Esophagitis, unspecified      (Dysphagia, unspecified type [R13.10])    Surgeons: Aime Payan MD Responsible Provider: Cal Sanchez MD    Anesthesia Type: MAC ASA Status: 2            Anesthesia Type: MAC    Tavo Phase I: Tavo Score: 10    Tavo Phase II: Tavo Score: 10    Anesthesia Post Evaluation    Patient location during evaluation: bedside  Patient participation: complete - patient participated  Level of consciousness: awake  Pain score: 0  Airway patency: patent  Nausea & Vomiting: no nausea and no vomiting  Cardiovascular status: hemodynamically stable  Respiratory status: acceptable  Hydration status: euvolemic  Pain management: adequate    No notable events documented.   When patient was in ED last week, palliative care was contacted for refill of stolen Methadone. Patient's palliative care provider is out of the office through the remainder of the month. Patient was given short script on 10/13/23 by covering provider, Tiera Yeboah CNP for 5 day supply to last until today when patient was added on for urgent FUV with Bertha Witt CNP which he now missed. Message routed to covering providers to see next steps.

## 2024-06-13 NOTE — OP NOTE
NAME:  Brianda Phillips   :   1955   MRN:   [id number]     Date/Time:  2024 11:16 AM    Esophagogastroduodenoscopy (EGD) Procedure Note    Procedure: Esophagogastroduodenoscopy with biopsy, esophageal dilation with Savary Bougie dilator over wire    Indication: dysphagia  Pre-operative Diagnosis: see indication above  Post-operative Diagnosis: see findings below  :  Aime Payan MD  Referring Provider:   -Lisa Jimenez MD    Exam:  Airway: clear, no airway problems anticipated  Heart: RRR, without gallops or rubs  Lungs: clear bilaterally without wheezes, crackles, or rhonchi  Abdomen: soft, nontender, nondistended, bowel sounds present  Mental Status: awake, alert and oriented to person, place and time     Anethesia/Sedation:  MAC anesthesia Propofol 100mg IV         Procedure Details   After informed consent was obtained for the procedure, with all risks and benefits of procedure explained the patient was taken to the endoscopy suite and placed in the left lateral decubitus position.  Following sequential administration of sedation  as per above, the [id number] gastroscope was inserted into the mouth and advanced under direct vision to second portion of the duodenum.  A careful inspection was made as the gastroscope was withdrawn, including a retroflexed view of the proximal stomach;  findings and interventions are described below.                                                                                                                                                                           Findings:    -Esophageal ring consistent with Schatzki ring in distal esophagus; dilated with 57FR Savary dilator over wire with endoscopic evaluation afterwards  -LA Grade A distal esophagitis; biopsied  -Small 3cm hiatal hernia from 36-39cm  -Normal stomach mucosa  -Normal duodenal mucosa     Therapies:  esophageal dilation with savary sizes 54FR; biopsy of esophagus  Specimens: #1 g-e

## 2024-06-13 NOTE — DISCHARGE INSTRUCTIONS
Brianda Phillips  [id number]  1955    EGD DISCHARGE INSTRUCTIONS  Discomfort:  Sore throat- throat lozenges or warm salt water gargle  redness at IV site- apply warm compress to area; if redness or soreness persist- contact your physician  Gaseous discomfort- walking, belching will help relieve any discomfort  You may not operate a vehicle for 12 hours  You may not engage in an occupation involving machinery or appliances for rest of today  You may not drink alcoholic beverages for at least 12 hours  Avoid making any critical decisions for at least 24 hour  DIET  You may have minimal sips at this time-- do not eat or drink for two hours.  You may eat and drink after 1145am today  You may resume your regular diet - however -  remember your colon is empty and a heavy meal will produce gas.   Avoid these foods:  vegetables, fried / greasy foods, carbonated drinks    MEDICATIONS:  [unfilled]    ACTIVITY  You may resume your normal daily activities until tomorrow AM;  Spend the remainder of the day resting -  avoid any strenuous activity.  CALL M.D.  ANY SIGN OF   Increasing pain, nausea, vomiting  Abdominal distension (swelling)  New increased bleeding (oral or rectal)  Fever (chills)  Pain in chest area  Bloody discharge from nose or mouth  Shortness of breath    IMPRESSION:  -Esophageal ring consistent with Schatzki ring in distal esophagus; dilated with 57FR (19mm) Savary dilator over wire with endoscopic evaluation afterwards  -LA Grade A distal esophagitis; biopsied  -Small 3cm hiatal hernia from 36-39cm  -Normal stomach mucosa  -Normal duodenal mucosa    Follow-up Instructions:   Call Dr. Payan for the results of procedure / biopsy in 7-10 days   Telephone # [phone]  Continue current medications    Aime Payan MD    Patient Education on Sedation / Analgesia Administered for Procedure      For 24 hours after general anesthesia or intravenous analgesia / sedation:  Have someone responsible help you  with your care  Limit your activities  Do not drive and operate hazardous machinery  Do not make important personal, legal or business decisions  Do not drink alcoholic beverages  If you have not urinated within 8 hours after discharge, please contact your physician  Resume your medications unless otherwise instructed    For 24 hours after general anesthesia or intravenous analgesia / sedation  you may experience:  Drowsiness, dizziness, sleepiness, or confusion  Difficulty remembering or delayed reaction times  Difficulty with your balance, especially while walking, move slowly and carefully, do not make sudden position changes  Difficulty focusing or blurred vision    You may not be aware of slight changes in your behavior and/or your reaction time because of the medication used during and after your procedure.    Report the following to your physician:  Excessive pain, swelling, redness or odor of or around the surgical area  Temperature over 100.5  Nausea and vomiting lasting longer than 4 hours or if unable to take medications  Any signs of decreased circulation or nerve impairment to extremity: change in color, persistent numbness, tingling, coldness or increase pain  Any questions or concerns    IF YOU REPORT TO AN EMERGENCY ROOM, DOCTOR'S OFFICE OR HOSPITAL WITHIN 24 HOURS AFTER YOUR PROCEDURE, BRING THIS SHEET AND YOUR AFTER VISIT SUMMARY WITH YOU AND GIVE IT TO THE PHYSICIAN OR NURSE ATTENDING YOU.

## 2024-07-01 ENCOUNTER — HOSPITAL ENCOUNTER (OUTPATIENT)
Facility: HOSPITAL | Age: 69
Discharge: HOME | End: 2024-07-04
Payer: MEDICARE

## 2024-07-01 DIAGNOSIS — R13.10 DYSPHAGIA, UNSPECIFIED TYPE: ICD-10-CM

## 2024-07-01 PROCEDURE — 74220 X-RAY XM ESOPHAGUS 1CNTRST: CPT

## 2025-07-16 RX ORDER — MULTIVITAMIN WITH IRON
1 TABLET ORAL DAILY
COMMUNITY

## 2025-07-17 ENCOUNTER — ANESTHESIA EVENT (OUTPATIENT)
Facility: HOSPITAL | Age: 70
End: 2025-07-17
Payer: MEDICARE

## 2025-07-17 ENCOUNTER — HOSPITAL ENCOUNTER (OUTPATIENT)
Facility: HOSPITAL | Age: 70
Setting detail: OUTPATIENT SURGERY
Discharge: HOME | End: 2025-07-17
Attending: INTERNAL MEDICINE | Admitting: INTERNAL MEDICINE
Payer: MEDICARE

## 2025-07-17 ENCOUNTER — ANESTHESIA (OUTPATIENT)
Facility: HOSPITAL | Age: 70
End: 2025-07-17
Payer: MEDICARE

## 2025-07-17 VITALS
BODY MASS INDEX: 26.49 KG/M2 | SYSTOLIC BLOOD PRESSURE: 140 MMHG | HEIGHT: 65 IN | WEIGHT: 159 LBS | RESPIRATION RATE: 11 BRPM | HEART RATE: 74 BPM | TEMPERATURE: 97 F | OXYGEN SATURATION: 99 % | DIASTOLIC BLOOD PRESSURE: 67 MMHG

## 2025-07-17 PROCEDURE — 3600007512: Performed by: INTERNAL MEDICINE

## 2025-07-17 PROCEDURE — 3600007502: Performed by: INTERNAL MEDICINE

## 2025-07-17 PROCEDURE — 2709999900 HC NON-CHARGEABLE SUPPLY: Performed by: INTERNAL MEDICINE

## 2025-07-17 PROCEDURE — 2580000003 HC RX 258: Performed by: INTERNAL MEDICINE

## 2025-07-17 PROCEDURE — 6360000002 HC RX W HCPCS: Performed by: NURSE ANESTHETIST, CERTIFIED REGISTERED

## 2025-07-17 PROCEDURE — 3700000000 HC ANESTHESIA ATTENDED CARE: Performed by: INTERNAL MEDICINE

## 2025-07-17 PROCEDURE — 7100000010 HC PHASE II RECOVERY - FIRST 15 MIN: Performed by: INTERNAL MEDICINE

## 2025-07-17 PROCEDURE — 7100000011 HC PHASE II RECOVERY - ADDTL 15 MIN: Performed by: INTERNAL MEDICINE

## 2025-07-17 PROCEDURE — 88305 TISSUE EXAM BY PATHOLOGIST: CPT

## 2025-07-17 PROCEDURE — 2580000003 HC RX 258: Performed by: NURSE ANESTHETIST, CERTIFIED REGISTERED

## 2025-07-17 PROCEDURE — 3700000001 HC ADD 15 MINUTES (ANESTHESIA): Performed by: INTERNAL MEDICINE

## 2025-07-17 RX ORDER — SODIUM CHLORIDE 0.9 % (FLUSH) 0.9 %
5-40 SYRINGE (ML) INJECTION PRN
Status: DISCONTINUED | OUTPATIENT
Start: 2025-07-17 | End: 2025-07-17 | Stop reason: HOSPADM

## 2025-07-17 RX ORDER — 0.9 % SODIUM CHLORIDE 0.9 %
INTRAVENOUS SOLUTION INTRAVENOUS
Status: DISCONTINUED | OUTPATIENT
Start: 2025-07-17 | End: 2025-07-17 | Stop reason: SDUPTHER

## 2025-07-17 RX ORDER — SODIUM CHLORIDE 9 MG/ML
INJECTION, SOLUTION INTRAVENOUS PRN
Status: DISCONTINUED | OUTPATIENT
Start: 2025-07-17 | End: 2025-07-17 | Stop reason: HOSPADM

## 2025-07-17 RX ORDER — SODIUM CHLORIDE 0.9 % (FLUSH) 0.9 %
5-40 SYRINGE (ML) INJECTION EVERY 12 HOURS SCHEDULED
Status: DISCONTINUED | OUTPATIENT
Start: 2025-07-17 | End: 2025-07-17 | Stop reason: HOSPADM

## 2025-07-17 RX ADMIN — PROPOFOL 30 MG: 10 INJECTION, EMULSION INTRAVENOUS at 10:50

## 2025-07-17 RX ADMIN — SODIUM CHLORIDE: 0.9 INJECTION, SOLUTION INTRAVENOUS at 10:12

## 2025-07-17 RX ADMIN — PROPOFOL 100 MG: 10 INJECTION, EMULSION INTRAVENOUS at 10:47

## 2025-07-17 RX ADMIN — SODIUM CHLORIDE 600 ML: 9 INJECTION, SOLUTION INTRAVENOUS at 10:53

## 2025-07-17 RX ADMIN — PROPOFOL 100 MG: 10 INJECTION, EMULSION INTRAVENOUS at 10:39

## 2025-07-17 RX ADMIN — LIDOCAINE HYDROCHLORIDE 100 MG: 20 INJECTION, SOLUTION EPIDURAL; INFILTRATION; INTRACAUDAL; PERINEURAL at 10:39

## 2025-07-17 ASSESSMENT — PAIN - FUNCTIONAL ASSESSMENT: PAIN_FUNCTIONAL_ASSESSMENT: 0-10

## 2025-07-17 NOTE — H&P
Gastroenterology Outpatient History and Physical    Patient: Brianda Adamesford    Physician: Aime Payan MD    Chief Complaint: pers hx colon cancer  History of Present Illness: 68yo F with pers hx CRC dx 2006.  Last colonoscopy 6/5/2020    History:  Past Medical History:   Diagnosis Date    Arthropathy of cervical facet joint     Cancer (HCC) 2006    colon    Carotid atherosclerosis 9/2012    High cholesterol     Hypertension 2007    Osteopenia 9/2012    Prediabetes       Past Surgical History:   Procedure Laterality Date    CHOLECYSTECTOMY      COLECTOMY  2006    COLONOSCOPY N/A 6/25/2020    COLONOSCOPY, EGD performed by Aime Payan MD at [facility] ENDOSCOPY    COLONOSCOPY,DIAGNOSTIC  6/25/2020         COLSC FLX WITH DIRECTED SUBMUCOSAL NJX ANY SBST  2006    colonoscopy Q 3 years    UPPER GASTROINTESTINAL ENDOSCOPY N/A 06/13/2024    ESOPHAGOGASTRODUODENOSCOPY BIOPSY performed by Aime Payan MD at [facility] ENDOSCOPY    UPPER GASTROINTESTINAL ENDOSCOPY  06/13/2024    ESOPHAGOGASTRODUODENOSCOPY DILATION SAVORY performed by Aime Payan MD at [facility] ENDOSCOPY    UPPER GI ENDOSCOPY,BIOPSY  6/25/2020         UPPER GI ENDOSCOPY,DILATN W GUIDE  6/25/2020           Social History     Socioeconomic History    Marital status:      Spouse name: None    Number of children: None    Years of education: None    Highest education level: None   Tobacco Use    Smoking status: Never    Smokeless tobacco: Never   Vaping Use    Vaping status: Never Used   Substance and Sexual Activity    Alcohol use: Not Currently     Comment: less than monthly    Drug use: No   Social History Narrative    , , 2 children      Family History   Problem Relation Age of Onset    Stroke Father     Hypertension Father     Diabetes Mother     Hypertension Mother       Patient Active Problem List   Diagnosis    Prediabetes    Essential hypertension    High cholesterol       Allergies:   Allergies   Allergen Reactions

## 2025-07-17 NOTE — DISCHARGE INSTRUCTIONS
Brianda Phillips  [id number]  1955    COLON DISCHARGE INSTRUCTIONS    CALL M.D.  ANY SIGN OF:   Increasing pain, nausea, vomiting  Abdominal distension (swelling)  New increased bleeding (oral or rectal)  Fever (chills)  Pain in chest area  Bloody discharge from nose or mouth  Shortness of breath    For 24 hours after general anesthesia or intravenous analgesia / sedation  you may experience:  Drowsiness, dizziness, sleepiness, or confusion  Difficulty remembering or delayed reaction times  Difficulty with your balance, especially while walking, move slowly and carefully, do not make sudden position changes  Difficulty focusing or blurred vision    Discomfort:  Redness at IV site- apply warm compress to area; if redness or soreness persist- contact your physician  There may be a slight amount of blood passed from the rectum  Gaseous discomfort- walking, belching will help relieve any discomfort    DIET:   High fiber diet.   - however -  remember your colon is empty and a heavy meal will produce gas.   Avoid these foods:  vegetables, fried / greasy foods, carbonated drinks for today    MEDICATION:  [unfilled]     ACTIVITY:  You may not resume your normal daily activities until tomorrow AM; it is recommended that you spend the remainder of the day resting -  avoid any strenuous activity.  You may not operate a vehicle for 12 hours  You may not engage in an occupation involving machinery or appliances for rest of today  You may not drink alcoholic beverages for at least 12 hours  Avoid making any critical decisions for at least 24 hour    IMPRESSION:  -Scattered sigmoid colon diverticulosis  -Single diminutive benign-appearing 2mm sessile polyp in the sigmoid coln at 50cm; removed with cold snare  -Small grade 1 internal hemorrhoids    Follow-up Instructions:   Call Dr. Payan for the results of procedure / biopsy in 7-10 days  Telephone # [phone]  If adenomatous polyp is revealed, repeat colonoscopy in 1

## 2025-07-17 NOTE — ANESTHESIA PRE PROCEDURE
Vascular: negative vascular ROS.         Other Findings:         Anesthesia Plan      MAC     ASA 2       Induction: intravenous.      Anesthetic plan and risks discussed with patient.      Plan discussed with CRNA.                  Tay Kearney MD   7/17/2025

## 2025-07-17 NOTE — PROGRESS NOTES
Endoscopy Case End Note:    1054:  Procedure scope was pre-cleaned, per protocol, at bedside by Harlan.      1054:  Report received from anesthesia - TIKI Ogden.  See anesthesia flowsheet for intra-procedure vital signs and events.      1056 -Glasses returned to patient.

## 2025-07-17 NOTE — OP NOTE
NAME:  Brianda Phillips   :   1955   MRN:   [id number]     Date/Time:  2025 11:01 AM    Colonoscopy Operative Report    Procedure Type:   Colonoscopy with polypectomy (cold snare)     Indications:     Personal history of colon cancer (screening only)  Pre-operative Diagnosis: see indication above  Post-operative Diagnosis:  See findings below  :  Aime Payan MD  Referring Provider: Lisa Samaniego MD    Exam:  Airway: clear, no airway problems anticipated  Heart: RRR, without gallops or rubs  Lungs: clear bilaterally without wheezes, crackles, or rhonchi  Abdomen: soft, nontender, nondistended, bowel sounds present  Mental Status: awake, alert and oriented to person, place and time    Sedation:  MAC anesthesia Propofol 130mg IV  Procedure Details:  After informed consent was obtained with all risks and benefits of procedure explained and preoperative exam completed, the patient was taken to the endoscopy suite and placed in the left lateral decubitus position.  Upon sequential sedation as per above, a digital rectal exam was performed demonstrating internal hemorrhoids.  The Olympus videocolonoscope  was inserted in the rectum and carefully advanced to the cecum, which was identified by the ileocecal valve and appendiceal orifice.   The quality of preparation was adequate.  The colonoscope was slowly withdrawn with careful evaluation between folds. Retroflexion in the rectum was completed demonstrating internal hemorrhoids.     Findings:     -Scattered sigmoid colon diverticulosis  -Single diminutive benign-appearing 2mm sessile polyp in the sigmoid coln at 50cm; removed with cold snare  -Small grade 1 internal hemorrhoids    Specimen Removed:  #1 sigmoid colon polyps  Complications: None.   EBL:  None.    Impression:    -Scattered sigmoid colon diverticulosis  -Single diminutive benign-appearing 2mm sessile polyp in the sigmoid coln at 50cm; removed with cold snare  -Small grade 1

## 2025-07-17 NOTE — ANESTHESIA POSTPROCEDURE EVALUATION
Department of Anesthesiology  Postprocedure Note    Patient: Brianda Phillips  MRN: [id number]  YOB: 1955  Date of evaluation: 7/17/2025    Procedure Summary       Date: 07/17/25 Room / Location: [facility] ENDO 02 / [facility] ENDOSCOPY    Anesthesia Start: 1038 Anesthesia Stop: 1054    Procedure: COLONOSCOPY (Lower GI Region) Diagnosis:       Personal history of colon cancer      Polyp, sigmoid colon      First degree hemorrhoids      Diverticulosis of colon (without mention of hemorrhage)      (Personal history of colon cancer [Z85.038])    Surgeons: Aime Payan MD Responsible Provider: Tay Kearney MD    Anesthesia Type: MAC ASA Status: 2            Anesthesia Type: MAC    Tavo Phase I: Tavo Score: 10    Tavo Phase II: Tavo Score: 10    Anesthesia Post Evaluation    Patient location during evaluation: PACU  Patient participation: complete - patient participated  Level of consciousness: awake and alert  Airway patency: patent  Nausea & Vomiting: no nausea and no vomiting  Cardiovascular status: hemodynamically stable  Respiratory status: acceptable  Hydration status: stable    No notable events documented.

## 2025-07-17 NOTE — PROGRESS NOTES
ARRIVAL INFORMATION:  Verified patient name and date of birth, scheduled procedure, and informed consent.- -    : Gil  contact number: [phone]  Physician and staff can share information with the .     Receive texts: yes    Belongings with patient include:  Clothing,Glasses, Jewelry    GI FOCUSED ASSESSMENT:  Neuro: Awake, alert, oriented x4  Respiratory: even and unlabored   GI: soft and non-distended  EKG Rhythm: normal sinus rhythm    Education:Reviewed general discharge instructions and  information.

## (undated) DEVICE — CUFF BLD PRSS AD CLTH SGL TB W/ BAYNT CONN ROUNDED CORNER

## (undated) DEVICE — Device

## (undated) DEVICE — BASIN EMSIS 16OZ GRAPHITE PLAS KID SHP MOLD GRAD FOR ORAL

## (undated) DEVICE — TOWEL 4 PLY TISS 19X30 SUE WHT

## (undated) DEVICE — SYR 3ML LL TIP 1/10ML GRAD --

## (undated) DEVICE — CATHETER IV 18GA L1.16IN OD1.27-1.3462MM ID0.9398-1.016MM

## (undated) DEVICE — NEEDLE HYPO 18GA L1.5IN PNK S STL HUB POLYPR SHLD REG BVL

## (undated) DEVICE — SYR 10ML LUER LOK 1/5ML GRAD --

## (undated) DEVICE — BAG SPEC BIOHZRD 10 X 10 IN --

## (undated) DEVICE — SET GRAV CK VLV NEEDLESS ST 3 GANGED 4WAY STPCOCK HI FLO 10

## (undated) DEVICE — CONTAINER SPEC 20 ML LID NEUT BUFF FORMALIN 10 % POLYPR STS

## (undated) DEVICE — SNARE

## (undated) DEVICE — ELECTRODE PT RET AD L9FT HI MOIST COND ADH HYDRGEL CORDED

## (undated) DEVICE — SNARE ENDOSCP POLYP MED 2.4 MM 240 CM 27 MM 2.8 MM SHT SENS

## (undated) DEVICE — ENDOSCOPIC KIT COMPLIANCE ENDOKIT

## (undated) DEVICE — NEONATAL-ADULT SPO2 SENSOR: Brand: NELLCOR

## (undated) DEVICE — FORCEPS BX L240CM JAW DIA2.4MM ORNG L CAP W/ NDL DISP RAD

## (undated) DEVICE — Z DISCONTINUED PER MEDLINE LINE GAS SAMPLING O2/CO2 LNG AD 13 FT NSL W/ TBNG FILTERLINE

## (undated) DEVICE — TIP SUCT TRNSPAR RIB SURF STD BLB RIG NVENT W/ 5IN1 CONN DYND50138] MEDLINE INDUSTRIES INC]

## (undated) DEVICE — COVIDIEN KENDALL DL DISPOSABLE 3 LEAD SY: Brand: MEDLINE RENEWAL

## (undated) DEVICE — IV START KIT: Brand: MEDLINE

## (undated) DEVICE — 1200 GUARD II KIT W/5MM TUBE W/O VAC TUBE: Brand: GUARDIAN

## (undated) DEVICE — FORCEPS BX L160CM DIA8MM GRSP DISECT CUP TIP NONLOCKING ROT

## (undated) DEVICE — CATH IV AUTOGRD BC PNK 20GA 25 -- INSYTE

## (undated) DEVICE — TRAP ENDOSCP POLYP 2 CHMBR DRAWER TYP

## (undated) DEVICE — ELECTRODE,RADIOTRANSLUCENT,FOAM,5PK: Brand: MEDLINE

## (undated) DEVICE — BITEBLOCK 54FR W/ DENT RIM BLOX

## (undated) DEVICE — SET ADMIN 16ML TBNG L100IN 2 Y INJ SITE IV PIGGY BK DISP

## (undated) DEVICE — BLOCK BITE ENDOSCP AD 21 MM W/ DIL BLU LF DISP

## (undated) DEVICE — SYR ASSEMB INFL BLLN 60ML --

## (undated) DEVICE — CLEANGUIDE DISPOSABLE MARKED SPRING TIP GUIDEWIRE, 1.86 MM X 210 CM: Brand: CLEANGUIDE

## (undated) DEVICE — SOLIDIFIER MEDC 1200ML -- CONVERT TO 356117

## (undated) DEVICE — YANKAUER,TAPERED BULBOUS TIP,W/O VENT: Brand: MEDLINE